# Patient Record
Sex: FEMALE | Race: BLACK OR AFRICAN AMERICAN | Employment: OTHER | URBAN - METROPOLITAN AREA
[De-identification: names, ages, dates, MRNs, and addresses within clinical notes are randomized per-mention and may not be internally consistent; named-entity substitution may affect disease eponyms.]

---

## 2023-02-01 ENCOUNTER — APPOINTMENT (OUTPATIENT)
Dept: ULTRASOUND IMAGING | Age: 73
DRG: 418 | End: 2023-02-01
Payer: MEDICARE

## 2023-02-01 ENCOUNTER — HOSPITAL ENCOUNTER (INPATIENT)
Age: 73
LOS: 3 days | Discharge: HOME | DRG: 418 | End: 2023-02-04
Attending: EMERGENCY MEDICINE | Admitting: INTERNAL MEDICINE
Payer: MEDICARE

## 2023-02-01 ENCOUNTER — APPOINTMENT (OUTPATIENT)
Dept: GENERAL RADIOLOGY | Age: 73
DRG: 418 | End: 2023-02-01
Payer: MEDICARE

## 2023-02-01 ENCOUNTER — APPOINTMENT (OUTPATIENT)
Dept: CT IMAGING | Age: 73
DRG: 418 | End: 2023-02-01
Payer: MEDICARE

## 2023-02-01 DIAGNOSIS — K81.0 ACUTE CHOLECYSTITIS: Primary | ICD-10-CM

## 2023-02-01 LAB
A/G RATIO: 1.2 (ref 1.1–2.2)
ALBUMIN SERPL-MCNC: 3.7 G/DL (ref 3.4–5)
ALP BLD-CCNC: 111 U/L (ref 40–129)
ALT SERPL-CCNC: 14 U/L (ref 10–40)
ANION GAP SERPL CALCULATED.3IONS-SCNC: 9 MMOL/L (ref 3–16)
AST SERPL-CCNC: 17 U/L (ref 15–37)
BACTERIA: ABNORMAL /HPF
BASOPHILS ABSOLUTE: 0.1 K/UL (ref 0–0.2)
BASOPHILS RELATIVE PERCENT: 1.1 %
BILIRUB SERPL-MCNC: <0.2 MG/DL (ref 0–1)
BILIRUBIN URINE: NEGATIVE
BLOOD, URINE: NEGATIVE
BUN BLDV-MCNC: 26 MG/DL (ref 7–20)
CALCIUM SERPL-MCNC: 9.2 MG/DL (ref 8.3–10.6)
CHLORIDE BLD-SCNC: 107 MMOL/L (ref 99–110)
CLARITY: ABNORMAL
CO2: 26 MMOL/L (ref 21–32)
COLOR: YELLOW
CREAT SERPL-MCNC: 1.1 MG/DL (ref 0.6–1.2)
EKG ATRIAL RATE: 58 BPM
EKG DIAGNOSIS: NORMAL
EKG P AXIS: 63 DEGREES
EKG P-R INTERVAL: 176 MS
EKG Q-T INTERVAL: 452 MS
EKG QRS DURATION: 84 MS
EKG QTC CALCULATION (BAZETT): 443 MS
EKG R AXIS: 39 DEGREES
EKG T AXIS: 45 DEGREES
EKG VENTRICULAR RATE: 58 BPM
EOSINOPHILS ABSOLUTE: 0.1 K/UL (ref 0–0.6)
EOSINOPHILS RELATIVE PERCENT: 1 %
EPITHELIAL CELLS, UA: 7 /HPF (ref 0–5)
GFR SERPL CREATININE-BSD FRML MDRD: 54 ML/MIN/{1.73_M2}
GLUCOSE BLD-MCNC: 113 MG/DL (ref 70–99)
GLUCOSE URINE: NEGATIVE MG/DL
HCT VFR BLD CALC: 33.9 % (ref 36–48)
HEMOGLOBIN: 10.8 G/DL (ref 12–16)
HYALINE CASTS: 12 /LPF (ref 0–8)
HYALINE CASTS: PRESENT
KETONES, URINE: ABNORMAL MG/DL
LEUKOCYTE ESTERASE, URINE: NEGATIVE
LIPASE: 228 U/L (ref 13–60)
LYMPHOCYTES ABSOLUTE: 2.3 K/UL (ref 1–5.1)
LYMPHOCYTES RELATIVE PERCENT: 17.3 %
MCH RBC QN AUTO: 27.8 PG (ref 26–34)
MCHC RBC AUTO-ENTMCNC: 31.8 G/DL (ref 31–36)
MCV RBC AUTO: 87.3 FL (ref 80–100)
MICROSCOPIC EXAMINATION: YES
MONOCYTES ABSOLUTE: 0.8 K/UL (ref 0–1.3)
MONOCYTES RELATIVE PERCENT: 6.3 %
NEUTROPHILS ABSOLUTE: 9.8 K/UL (ref 1.7–7.7)
NEUTROPHILS RELATIVE PERCENT: 74.3 %
NITRITE, URINE: NEGATIVE
PDW BLD-RTO: 16.8 % (ref 12.4–15.4)
PH UA: 5 (ref 5–8)
PLATELET # BLD: 207 K/UL (ref 135–450)
PMV BLD AUTO: 10.6 FL (ref 5–10.5)
POTASSIUM REFLEX MAGNESIUM: 4.1 MMOL/L (ref 3.5–5.1)
PROTEIN UA: 30 MG/DL
RBC # BLD: 3.88 M/UL (ref 4–5.2)
RBC UA: 1 /HPF (ref 0–4)
SODIUM BLD-SCNC: 142 MMOL/L (ref 136–145)
SPECIFIC GRAVITY UA: 1.01 (ref 1–1.03)
TOTAL CK: 81 U/L (ref 26–192)
TOTAL PROTEIN: 6.8 G/DL (ref 6.4–8.2)
TROPONIN: <0.01 NG/ML
URINE REFLEX TO CULTURE: ABNORMAL
URINE TYPE: ABNORMAL
UROBILINOGEN, URINE: 1 E.U./DL
WBC # BLD: 13.2 K/UL (ref 4–11)
WBC UA: 5 /HPF (ref 0–5)

## 2023-02-01 PROCEDURE — 76705 ECHO EXAM OF ABDOMEN: CPT

## 2023-02-01 PROCEDURE — 84484 ASSAY OF TROPONIN QUANT: CPT

## 2023-02-01 PROCEDURE — 6360000002 HC RX W HCPCS: Performed by: PHYSICIAN ASSISTANT

## 2023-02-01 PROCEDURE — 6360000004 HC RX CONTRAST MEDICATION: Performed by: PHYSICIAN ASSISTANT

## 2023-02-01 PROCEDURE — 2580000003 HC RX 258: Performed by: INTERNAL MEDICINE

## 2023-02-01 PROCEDURE — 82550 ASSAY OF CK (CPK): CPT

## 2023-02-01 PROCEDURE — 96365 THER/PROPH/DIAG IV INF INIT: CPT

## 2023-02-01 PROCEDURE — 94760 N-INVAS EAR/PLS OXIMETRY 1: CPT

## 2023-02-01 PROCEDURE — 6360000002 HC RX W HCPCS: Performed by: INTERNAL MEDICINE

## 2023-02-01 PROCEDURE — 81001 URINALYSIS AUTO W/SCOPE: CPT

## 2023-02-01 PROCEDURE — 93010 ELECTROCARDIOGRAM REPORT: CPT | Performed by: INTERNAL MEDICINE

## 2023-02-01 PROCEDURE — 71260 CT THORAX DX C+: CPT

## 2023-02-01 PROCEDURE — 93005 ELECTROCARDIOGRAM TRACING: CPT | Performed by: PHYSICIAN ASSISTANT

## 2023-02-01 PROCEDURE — 1200000000 HC SEMI PRIVATE

## 2023-02-01 PROCEDURE — APPNB30 APP NON BILLABLE TIME 0-30 MINS: Performed by: NURSE PRACTITIONER

## 2023-02-01 PROCEDURE — 71045 X-RAY EXAM CHEST 1 VIEW: CPT

## 2023-02-01 PROCEDURE — 6370000000 HC RX 637 (ALT 250 FOR IP): Performed by: INTERNAL MEDICINE

## 2023-02-01 PROCEDURE — 2580000003 HC RX 258: Performed by: PHYSICIAN ASSISTANT

## 2023-02-01 PROCEDURE — APPSS60 APP SPLIT SHARED TIME 46-60 MINUTES: Performed by: NURSE PRACTITIONER

## 2023-02-01 PROCEDURE — 80053 COMPREHEN METABOLIC PANEL: CPT

## 2023-02-01 PROCEDURE — 99285 EMERGENCY DEPT VISIT HI MDM: CPT

## 2023-02-01 PROCEDURE — 83690 ASSAY OF LIPASE: CPT

## 2023-02-01 PROCEDURE — 85025 COMPLETE CBC W/AUTO DIFF WBC: CPT

## 2023-02-01 RX ORDER — ONDANSETRON 4 MG/1
4 TABLET, ORALLY DISINTEGRATING ORAL EVERY 8 HOURS PRN
Status: DISCONTINUED | OUTPATIENT
Start: 2023-02-01 | End: 2023-02-04 | Stop reason: HOSPADM

## 2023-02-01 RX ORDER — SODIUM CHLORIDE 9 MG/ML
INJECTION, SOLUTION INTRAVENOUS PRN
Status: DISCONTINUED | OUTPATIENT
Start: 2023-02-01 | End: 2023-02-04 | Stop reason: HOSPADM

## 2023-02-01 RX ORDER — METOPROLOL SUCCINATE 100 MG/1
100 TABLET, EXTENDED RELEASE ORAL DAILY
COMMUNITY
Start: 2023-01-31

## 2023-02-01 RX ORDER — OMEGA-3S/DHA/EPA/FISH OIL 300-1000MG
300 CAPSULE ORAL DAILY
COMMUNITY

## 2023-02-01 RX ORDER — PANTOPRAZOLE SODIUM 40 MG/1
TABLET, DELAYED RELEASE ORAL
COMMUNITY
Start: 2023-01-18

## 2023-02-01 RX ORDER — METOPROLOL SUCCINATE 50 MG/1
100 TABLET, EXTENDED RELEASE ORAL DAILY
Status: DISCONTINUED | OUTPATIENT
Start: 2023-02-01 | End: 2023-02-04 | Stop reason: HOSPADM

## 2023-02-01 RX ORDER — ASPIRIN 81 MG/1
81 TABLET ORAL DAILY
Status: DISCONTINUED | OUTPATIENT
Start: 2023-02-01 | End: 2023-02-04 | Stop reason: HOSPADM

## 2023-02-01 RX ORDER — LOSARTAN POTASSIUM 100 MG/1
100 TABLET ORAL DAILY
COMMUNITY

## 2023-02-01 RX ORDER — HYDRALAZINE HYDROCHLORIDE 25 MG/1
25 TABLET, FILM COATED ORAL EVERY 8 HOURS SCHEDULED
Status: DISCONTINUED | OUTPATIENT
Start: 2023-02-01 | End: 2023-02-04 | Stop reason: HOSPADM

## 2023-02-01 RX ORDER — LOSARTAN POTASSIUM 100 MG/1
100 TABLET ORAL DAILY
Status: DISCONTINUED | OUTPATIENT
Start: 2023-02-01 | End: 2023-02-04 | Stop reason: HOSPADM

## 2023-02-01 RX ORDER — 0.9 % SODIUM CHLORIDE 0.9 %
500 INTRAVENOUS SOLUTION INTRAVENOUS ONCE
Status: COMPLETED | OUTPATIENT
Start: 2023-02-01 | End: 2023-02-01

## 2023-02-01 RX ORDER — LORATADINE 10 MG/1
10 TABLET ORAL DAILY
COMMUNITY

## 2023-02-01 RX ORDER — POLYETHYLENE GLYCOL 3350 17 G/17G
17 POWDER, FOR SOLUTION ORAL DAILY PRN
Status: DISCONTINUED | OUTPATIENT
Start: 2023-02-01 | End: 2023-02-04 | Stop reason: HOSPADM

## 2023-02-01 RX ORDER — SITAGLIPTIN 50 MG/1
TABLET, FILM COATED ORAL
COMMUNITY
Start: 2023-01-11

## 2023-02-01 RX ORDER — HYDROCHLOROTHIAZIDE 25 MG/1
TABLET ORAL
COMMUNITY
Start: 2023-01-09

## 2023-02-01 RX ORDER — ACETAMINOPHEN 325 MG/1
650 TABLET ORAL EVERY 6 HOURS PRN
Status: DISCONTINUED | OUTPATIENT
Start: 2023-02-01 | End: 2023-02-04 | Stop reason: HOSPADM

## 2023-02-01 RX ORDER — ATORVASTATIN CALCIUM 80 MG/1
80 TABLET, FILM COATED ORAL NIGHTLY
Status: DISCONTINUED | OUTPATIENT
Start: 2023-02-01 | End: 2023-02-04 | Stop reason: HOSPADM

## 2023-02-01 RX ORDER — SODIUM CHLORIDE 0.9 % (FLUSH) 0.9 %
5-40 SYRINGE (ML) INJECTION PRN
Status: DISCONTINUED | OUTPATIENT
Start: 2023-02-01 | End: 2023-02-04 | Stop reason: HOSPADM

## 2023-02-01 RX ORDER — ONDANSETRON 2 MG/ML
4 INJECTION INTRAMUSCULAR; INTRAVENOUS EVERY 6 HOURS PRN
Status: DISCONTINUED | OUTPATIENT
Start: 2023-02-01 | End: 2023-02-02

## 2023-02-01 RX ORDER — MORPHINE SULFATE 2 MG/ML
2 INJECTION, SOLUTION INTRAMUSCULAR; INTRAVENOUS EVERY 4 HOURS PRN
Status: DISCONTINUED | OUTPATIENT
Start: 2023-02-01 | End: 2023-02-04 | Stop reason: HOSPADM

## 2023-02-01 RX ORDER — ACETAMINOPHEN 650 MG/1
650 SUPPOSITORY RECTAL EVERY 6 HOURS PRN
Status: DISCONTINUED | OUTPATIENT
Start: 2023-02-01 | End: 2023-02-04 | Stop reason: HOSPADM

## 2023-02-01 RX ORDER — ENOXAPARIN SODIUM 100 MG/ML
40 INJECTION SUBCUTANEOUS DAILY
Status: DISCONTINUED | OUTPATIENT
Start: 2023-02-01 | End: 2023-02-02

## 2023-02-01 RX ORDER — ATORVASTATIN CALCIUM 40 MG/1
80 TABLET, FILM COATED ORAL NIGHTLY
COMMUNITY
Start: 2022-11-04

## 2023-02-01 RX ORDER — HYDRALAZINE HYDROCHLORIDE 25 MG/1
TABLET, FILM COATED ORAL
COMMUNITY
Start: 2023-01-18

## 2023-02-01 RX ORDER — SODIUM CHLORIDE 0.9 % (FLUSH) 0.9 %
5-40 SYRINGE (ML) INJECTION EVERY 12 HOURS SCHEDULED
Status: DISCONTINUED | OUTPATIENT
Start: 2023-02-01 | End: 2023-02-04 | Stop reason: HOSPADM

## 2023-02-01 RX ADMIN — PIPERACILLIN AND TAZOBACTAM 3375 MG: 3; .375 INJECTION, POWDER, FOR SOLUTION INTRAVENOUS at 17:07

## 2023-02-01 RX ADMIN — LOSARTAN POTASSIUM 100 MG: 100 TABLET, FILM COATED ORAL at 17:00

## 2023-02-01 RX ADMIN — HYDRALAZINE HYDROCHLORIDE 25 MG: 25 TABLET, FILM COATED ORAL at 16:59

## 2023-02-01 RX ADMIN — Medication 10 ML: at 21:25

## 2023-02-01 RX ADMIN — PIPERACILLIN AND TAZOBACTAM 3375 MG: 3; .375 INJECTION, POWDER, FOR SOLUTION INTRAVENOUS at 12:07

## 2023-02-01 RX ADMIN — ATORVASTATIN CALCIUM 80 MG: 80 TABLET, FILM COATED ORAL at 21:25

## 2023-02-01 RX ADMIN — IOPAMIDOL 75 ML: 755 INJECTION, SOLUTION INTRAVENOUS at 09:55

## 2023-02-01 RX ADMIN — SODIUM CHLORIDE: 9 INJECTION, SOLUTION INTRAVENOUS at 17:04

## 2023-02-01 RX ADMIN — HYDRALAZINE HYDROCHLORIDE 25 MG: 25 TABLET, FILM COATED ORAL at 21:25

## 2023-02-01 RX ADMIN — METOPROLOL SUCCINATE 100 MG: 50 TABLET, EXTENDED RELEASE ORAL at 17:00

## 2023-02-01 RX ADMIN — SODIUM CHLORIDE 500 ML: 9 INJECTION, SOLUTION INTRAVENOUS at 10:11

## 2023-02-01 ASSESSMENT — LIFESTYLE VARIABLES
HOW MANY STANDARD DRINKS CONTAINING ALCOHOL DO YOU HAVE ON A TYPICAL DAY: 1 OR 2
HOW OFTEN DO YOU HAVE A DRINK CONTAINING ALCOHOL: 2-4 TIMES A MONTH
HOW OFTEN DO YOU HAVE A DRINK CONTAINING ALCOHOL: MONTHLY OR LESS

## 2023-02-01 ASSESSMENT — PAIN DESCRIPTION - LOCATION: LOCATION: ABDOMEN;BACK

## 2023-02-01 ASSESSMENT — PAIN DESCRIPTION - DESCRIPTORS: DESCRIPTORS: ACHING

## 2023-02-01 ASSESSMENT — PAIN SCALES - GENERAL
PAINLEVEL_OUTOF10: 5
PAINLEVEL_OUTOF10: 0
PAINLEVEL_OUTOF10: 0

## 2023-02-01 ASSESSMENT — PAIN - FUNCTIONAL ASSESSMENT: PAIN_FUNCTIONAL_ASSESSMENT: 0-10

## 2023-02-01 NOTE — H&P
HOSPITALISTS HISTORY AND PHYSICAL    2/1/2023 1:41 PM    Patient Information:  Isaias Farfan is a 71 y.o. female [id number]  PCP:  Lesa Soto MD (Tel: [phone] )    Chief complaint:    Chief Complaint   Patient presents with    Abdominal Pain     Arrived via  EMS d/t abd pain, Fluttering cardiac (being checked out in March); Sinus joselyn; HTN; ; -59 manual        History of Present Illness:  Sofía Archibald is a 71 y.o. female   With feeling uncomfortable dull discomfort of radiating to her back and nausea but no vomiting no diarrhea no fever no chills no shortness of breath. In the ED patient had imaging that was suggestive of cholecystitis. Patient smokes half pack per day does not drink    REVIEW OF SYSTEMS:   Constitutional: Negative for fever,chills or night sweats  ENT: Negative for rhinorrhea, epistaxis, hoarseness, sore throat. Respiratory: Negative for shortness of breath,wheezing  Cardiovascular: Negative for chest pain, palpitations   Gastrointestinal: see above  Genitourinary: Negative for polyuria, dysuria   Hematologic/Lymphatic: Negative for bleeding tendency, easy bruising  Musculoskeletal: Negative for myalgias and arthralgias  Neurologic: Negative for confusion,dysarthria. Skin: Negative for itching,rash  Psychiatric: Negative for depression,anxiety, agitation. Endocrine: Negative for polydipsia,polyuria,heat /cold intolerance. Past Medical History:   has a past medical history of Allergic rhinitis, Asthma, Colorectal polyps, Hyperlipidemia, Menopause, Obesity, and Thyroid nodule. Past Surgical History:   has a past surgical history that includes Hysterectomy and Tubal ligation (28). Medications:  No current facility-administered medications on file prior to encounter.      Current Outpatient Medications on File Prior to Encounter   Medication Sig Dispense Refill    losartan (COZAAR) 100 MG tablet Take 100 mg by mouth daily      pantoprazole (PROTONIX) 40 MG tablet TAKE 1 TABLET BY MOUTH EVERY DAY      atorvastatin (LIPITOR) 40 MG tablet TAKE 1 TABLET BY MOUTH EVERY DAY      hydrALAZINE (APRESOLINE) 25 MG tablet TAKE 1 TABLET (25 MG TOTAL) BY MOUTH 2 TIMES A DAY. FOR HTN      loratadine (CLARITIN) 10 MG tablet Take 10 mg by mouth daily      metoprolol succinate (TOPROL XL) 100 MG extended release tablet       JANUVIA 50 MG tablet TAKE 1 TABLET BY MOUTH EVERY DAY      hydroCHLOROthiazide (HYDRODIURIL) 25 MG tablet TAKE 1 TABLET BY MOUTH EVERY DAY      lisinopril (PRINIVIL;ZESTRIL) 40 MG tablet Take 1 tablet by mouth daily. 90 tablet 3    aspirin 81 MG EC tablet Take 81 mg by mouth daily. Allergies: Allergies   Allergen Reactions    Penicillins         Social History:  Patient Lives at home   reports that she has been smoking cigarettes. She has been smoking an average of 1 pack per day. She does not have any smokeless tobacco history on file. She reports current alcohol use. She reports that she does not use drugs. Family History:  family history includes Heart Disease in her mother. ,     Physical Exam:  BP (!) 173/34   Pulse 57   Temp 98.7 °F (37.1 °C) (Oral)   Resp 15   Ht 5' 4\" (1.626 m)   Wt 195 lb (88.5 kg)   SpO2 98%   BMI 33.47 kg/m²     General appearance:  Appears comfortable. AAOx3  HEENT: atraumatic, Pupils equal, muscous membranes moist, no masses appreciated  Cardiovascular: Regular rate and rhythm no murmurs appreciated  Respiratory: CTAB no wheezing  Gastrointestinal: RUQ tenderness to palpation no guarding  EXT: no edema  Neurology: no gross focal deficts  Psychiatry: Appropriate affect.  Not agitated  Skin: Warm, dry, no rashes appreciated    Labs:  CBC:   Lab Results   Component Value Date/Time    WBC 13.2 02/01/2023 08:45 AM    RBC 3.88 02/01/2023 08:45 AM    HGB 10.8 02/01/2023 08:45 AM    HCT 33.9 02/01/2023 08:45 AM MCV 87.3 02/01/2023 08:45 AM    MCH 27.8 02/01/2023 08:45 AM    MCHC 31.8 02/01/2023 08:45 AM    RDW 16.8 02/01/2023 08:45 AM     02/01/2023 08:45 AM    MPV 10.6 02/01/2023 08:45 AM     BMP:    Lab Results   Component Value Date/Time     02/01/2023 08:45 AM    K 4.1 02/01/2023 08:45 AM     02/01/2023 08:45 AM    CO2 26 02/01/2023 08:45 AM    BUN 26 02/01/2023 08:45 AM    CREATININE 1.1 02/01/2023 08:45 AM    CALCIUM 9.2 02/01/2023 08:45 AM    LABGLOM 54 02/01/2023 08:45 AM    GLUCOSE 113 02/01/2023 08:45 AM     CT CHEST ABDOMEN PELVIS W CONTRAST Additional Contrast? None   Final Result   1. Cardiomegaly with mild interstitial edema. 2. Solid subcentimeter bilateral pulmonary nodules. 3. Mediastinal and left axillary adenopathy could be reactive. 4. Mild pericholecystic inflammatory stranding can be seen in the setting of   cholecystitis. RECOMMENDATION:   Fleischner Society guidelines for follow-up and management of incidentally   detected pulmonary nodules:      Multiple Solid Nodules:      Nodule size less than 6 mm   In a low-risk patient, no routine follow-up. In a high-risk patient, optional CT at 12 months. XR CHEST PORTABLE   Final Result   1. Cardiomegaly. US GALLBLADDER RUQ    (Results Pending)       Recent imaging reviewed    Problem List  Principal Problem:    Acute cholecystitis  Resolved Problems:    * No resolved hospital problems. *        Assessment/Plan:   Acute cholecystitis  - zosyn  - RUQ us  - iv morphine  - surgery consulted    Htn urgency: home meds and titrate up as needed      Elevated lipase trend    Tobacco abuse: counseled on need to to quit, does not want nicotine patch        DVT prophylaxis lovenox  Code status full code        Admit as inpatient I anticipate hospitalization spanning more than two midnights for investigation and treatment of the above medically necessary diagnoses.     Please note that some part of this chart was generated using Dragon dictation software. Although every effort was made to ensure the accuracy of this automated transcription, some errors in transcription may have occurred inadvertently. If you may need any clarification, please do not hesitate to contact me through [facility].        Daylin Woodruff MD    2/1/2023 1:41 PM

## 2023-02-01 NOTE — ED PROVIDER NOTES
I did not see this patient personally. I only interpreted their EKG.   Reveals:  joselyn sinus rhythm  No ST changes  Heart rate 58    no prior EKG available for comparison        Eddie Leon MD  02/01/23 6092

## 2023-02-01 NOTE — PROGRESS NOTES
Patient seen in ED, room 20. Admission completed except for: 4 Eyes Assessment, Immunizations, Covid Vaccines, Rights and Responsibilities, Orientation to room, Plan of Care, education, white board, height and weight, pain assessment and head to toe assessment. Patient is alert and oriented X 4. Patient lives at home with her spouse and is being admitted for acute cholecystitis. Home Medication Reconciliation has been verbally reviewed with patient and updated as appropriate. Plan of care updated if indicated. All questions answered.

## 2023-02-01 NOTE — PLAN OF CARE
[place] [address] and Laparoscopic Surgery        Assessment & Plan of Care    History of Present Illness: Ms. Ted Harrell is a 71 y.o. female who presented to the ED with constant, achy, upper abdominal pain that radiates into the chest and through to the back and began this morning around 0500. She reports that it's more of a discomfort than pain and rates the intensity a 5 out of 10. She tried Tylenol without relief, pain is better when lying still; no aggravating factors. Reports being unable to sleep after onset, just \"couldn't get comfortable\". Associated nausea and chills. She denies fevers, vomiting, or weight loss. She did have one similar episode about 6 months ago, but reports did not last as long. Only occasional alcohol intake; reports maybe one mixed drink within the last week. New medications includes Hydralazine, which she reports starting in 12/2022. Colonoscopy last in 2019. Prior abdominal surgery includes tubal ligation and hysterectomy for uterine cancer. No blood thinners. Current smoker. Physical Exam:  CONSTITUTIONAL:  alert, no apparent distress and mildly obese  NEUROLOGIC:  Mental Status Exam:  Level of Alertness:   awake  Orientation:   person, place, time  EYES:  sclera clear  ENT:  normocepalic, without obvious abnormality  NECK:  supple, symmetrical, trachea midline  LUNGS:  clear to auscultation  CARDIOVASCULAR:  regular rate and rhythm and no murmur noted  ABDOMEN: soft, non-distended, upper abdominal tenderness noted, voluntary guarding absent, no masses palpated, normal bowel sounds, and hernia absent  Extremities: no edema  SKIN:  no bruising or bleeding and normal skin color, texture, turgor    Assessment:  Acute calculous cholecystitis  Elevated serum lipase, 228    Plan:  1.  Ultrasound obtained and reveals cholelithiasis with mild gallbladder wall thickening and positive sonographic Padilla sign; continued supportive care, repeat labs tomorrow morning, tentatively planning for laparoscopic cholecystectomy tomorrow at 0900 with Dr. Viry Gutierrez  2. NPO; monitor bowel function  3. IV hydration; monitor and correct electrolytes  4. Antibiotics  5. Activity as tolerated  6. Pulmonary toilet, incentive spirometry  7. PRN analgesics and antiemetics--minimizing narcotics as tolerated  8. DVT prophylaxis with SCD's  9. Management of medical comorbid etiologies per primary team and consulting services    EDUCATION:  Educated patient on plan of care and disease process--all questions answered. Plans discussed with patient and nursing. Reviewed and discussed with Dr. Ebony Marcial consult to follow.       Signed:  SERENA Hayes - CNP  2/1/2023 4:05 PM

## 2023-02-01 NOTE — PROGRESS NOTES
4 Eyes Skin Assessment     NAME:  Wesley Temple  YOB: 1950  MEDICAL RECORD NUMBER:  [id number]    The patient is being assessed for  Admission    I agree that One RN have performed a thorough Head to Toe Skin Assessment on the patient. ALL assessment sites listed below have been assessed. Areas assessed by both nurses:    Head, Face, Ears, Shoulders, Back, Chest, Arms, Elbows, Hands, Sacrum. Buttock, Coccyx, Ischium, and Legs. Feet and Heels        Does the Patient have a Wound?  No noted wound(s)       Rafita Prevention initiated by RN: Yes   Wound Care Orders initiated by RN: Yes    Pressure Injury (Stage 3,4, Unstageable, DTI, NWPT, and Complex wounds) if present place referral order by RN under : NA    New and Established Ostomies, if present place, referral order under : NA      Nurse 1 eSignature: Electronically signed by Hamida Jules RN on 2/1/23 at 4:41 PM EST    **SHARE this note so that the co-signing nurse is able to place an eSignature**    Nurse 2 eSignature: Electronically signed by Luci Nunez LPN on 4/7/14 at 1:82 PM EST

## 2023-02-01 NOTE — ED PROVIDER NOTES
[facility]        Pt Name: Tiburcio Gill  MRN: [id number]  [place] 1950  Date of evaluation: 2/1/2023  Provider: Branden Faith PA-C  PCP: Anuja Rush MD  Note Started: 9:35 AM EST 2/1/23      SOILA. I have evaluated this patient. My supervising physician was available for consultation. CHIEF COMPLAINT       Chief Complaint   Patient presents with    Abdominal Pain     Arrived via  EMS d/t abd pain, Fluttering cardiac (being checked out in March); Sinus joselyn; HTN; ; -35 manual       HISTORY OF PRESENT ILLNESS: 1 or more Elements     History From: patient  Limitations to history : None    Tiburcio Gill is a 71 y.o. female who presents to the emergency department with a chief complaint of \"feeling uncomfortable. \"  She states she woke up at 5 AM this morning to go the bathroom and she felt okay. She states when she went back to bed she just felt \"uncomfortable, like I cannot get my pillow in the right position or like a dog trying to get themselves comfortable and lying down. \"  She states she has some discomfort in her back, chest and abdomen associated with this. She states she has had some intermittent nausea associated with this and weakness also. Denies vomiting, dysuria, hematuria, diarrhea, bloody stool, shortness of breath, cough, fevers or any other symptoms. Nursing Notes were all reviewed and agreed with or any disagreements were addressed in the HPI. REVIEW OF SYSTEMS :      Review of Systems    Positives and Pertinent negatives as per HPI. 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     ATORVASTATIN (LIPITOR) 40 MG TABLET    Take 80 mg by mouth at bedtime    HYDRALAZINE (APRESOLINE) 25 MG TABLET    TAKE 1 TABLET (25 MG TOTAL) BY MOUTH 2 TIMES A DAY. FOR HTN    HYDROCHLOROTHIAZIDE (HYDRODIURIL) 25 MG TABLET    TAKE 1 TABLET BY MOUTH EVERY DAY    JANUVIA 50 MG TABLET    TAKE 1 TABLET BY MOUTH EVERY DAY    LISINOPRIL (PRINIVIL;ZESTRIL) 40 MG TABLET    Take 1 tablet by mouth daily.    LORATADINE (CLARITIN) 10 MG TABLET    Take 10 mg by mouth daily    LOSARTAN (COZAAR) 100 MG TABLET    Take 100 mg by mouth daily    METOPROLOL SUCCINATE (TOPROL XL) 100 MG EXTENDED RELEASE TABLET    Take 100 mg by mouth daily    OMEGA-3 FATTY ACIDS (OMEGA-3 FISH OIL) 300 MG CAPS    Take 300 mg by mouth daily    PANTOPRAZOLE (PROTONIX) 40 MG TABLET    TAKE 1 TABLET BY MOUTH EVERY DAY       ALLERGIES     Cephalexin and Penicillins    FAMILYHISTORY       Family History   Problem Relation Age of Onset    Heart Disease Mother         CHF    Alcohol Abuse Brother     Heart Attack Brother     Stroke Brother         SOCIAL HISTORY       Social History     Tobacco Use    Smoking status: Every Day     Packs/day: 1.00     Types: Cigarettes    Tobacco comments:     Started: 1965: does not smoke more than 5 cigarettes in a day   Vaping Use    Vaping Use: Never used   Substance Use Topics    Alcohol use: Yes     Comment: 4 drinks a month    Drug use: No       SCREENINGS        [place] Coma Scale  Eye Opening: Spontaneous  Best Verbal Response: Oriented  Best Motor Response: Obeys commands  [place] Coma Scale Score: 15                CIWA Assessment  BP: (!) 190/42  Heart Rate: 50           PHYSICAL EXAM  1 or more Elements     ED Triage Vitals [02/01/23 0831]   BP Temp Temp Source Heart Rate Resp SpO2 Height Weight   (!) 211/71 98.7 °F (37.1 °C) Oral 57 16 98 % 5' 4\" (1.626 m) 195 lb (88.5 kg)       Physical Exam  Vitals and nursing note reviewed.   Constitutional:       Appearance: She is well-developed. She is not diaphoretic.   HENT:      Head: Atraumatic.       Nose: Nose normal.   Eyes:      General:         Right eye: No discharge. Left eye: No discharge. Cardiovascular:      Rate and Rhythm: Normal rate and regular rhythm. Heart sounds: No murmur heard. No friction rub. No gallop. Pulmonary:      Effort: Pulmonary effort is normal. No respiratory distress. Breath sounds: No stridor. No wheezing, rhonchi or rales. Abdominal:      General: Bowel sounds are normal. There is no distension. Palpations: Abdomen is soft. There is no mass. Tenderness: There is no abdominal tenderness. There is no guarding or rebound. Hernia: No hernia is present. Musculoskeletal:         General: No swelling. Normal range of motion. Cervical back: Normal range of motion. Skin:     General: Skin is warm and dry. Findings: No erythema or rash. Neurological:      Mental Status: She is alert and oriented to person, place, and time. Cranial Nerves: No cranial nerve deficit.    Psychiatric:         Behavior: Behavior normal.           DIAGNOSTIC RESULTS   LABS:    Labs Reviewed   CBC WITH AUTO DIFFERENTIAL - Abnormal; Notable for the following components:       Result Value    WBC 13.2 (*)     RBC 3.88 (*)     Hemoglobin 10.8 (*)     Hematocrit 33.9 (*)     RDW 16.8 (*)     MPV 10.6 (*)     Neutrophils Absolute 9.8 (*)     All other components within normal limits   COMPREHENSIVE METABOLIC PANEL W/ REFLEX TO MG FOR LOW K - Abnormal; Notable for the following components:    Glucose 113 (*)     BUN 26 (*)     Est, Glom Filt Rate 54 (*)     All other components within normal limits   URINALYSIS WITH REFLEX TO CULTURE - Abnormal; Notable for the following components:    Clarity, UA CLOUDY (*)     Ketones, Urine TRACE (*)     Protein, UA 30 (*)     All other components within normal limits   LIPASE - Abnormal; Notable for the following components:    Lipase 228.0 (*)     All other components within normal limits   MICROSCOPIC URINALYSIS - Abnormal; Notable for the following components:    Bacteria, UA 1+ (*)     Hyaline Casts, UA 12 (*)     Epithelial Cells, UA 7 (*)     Hyaline Casts, UA Present (*)     All other components within normal limits   TROPONIN   CK       When ordered only abnormal lab results are displayed. All other labs were within normal range or not returned as of this dictation. EKG: When ordered, EKG's are interpreted by the Emergency Department Physician in the absence of a cardiologist.  Please see their note for interpretation of EKG. RADIOLOGY:   Non-plain film images such as CT, Ultrasound and MRI are read by the radiologist. Plain radiographic images are visualized and preliminarily interpreted by the ED Provider with the below findings:        Interpretation per the Radiologist below, if available at the time of this note:    [address]   Final Result   Cholelithiasis with mild gallbladder wall thickening and positive sonographic   Padilla sign. Findings are suggestive of acute cholecystitis. Recommend HIDA   scan for confirmation. CT CHEST ABDOMEN PELVIS W CONTRAST Additional Contrast? None   Final Result   1. Cardiomegaly with mild interstitial edema. 2. Solid subcentimeter bilateral pulmonary nodules. 3. Mediastinal and left axillary adenopathy could be reactive. 4. Mild pericholecystic inflammatory stranding can be seen in the setting of   cholecystitis. RECOMMENDATION:   Fleischner Society guidelines for follow-up and management of incidentally   detected pulmonary nodules:      Multiple Solid Nodules:      Nodule size less than 6 mm   In a low-risk patient, no routine follow-up. In a high-risk patient, optional CT at 12 months. XR CHEST PORTABLE   Final Result   1. Cardiomegaly.            XR CHEST PORTABLE    Result Date: 2/1/2023  EXAMINATION: ONE XRAY VIEW OF THE CHEST 2/1/2023 8:48 am COMPARISON: 01/10/2008 HISTORY: ORDERING SYSTEM PROVIDED HISTORY: weakness TECHNOLOGIST PROVIDED HISTORY: Reason for exam:->weakness FINDINGS: The lungs are clear. The cardiac silhouette is enlarged. There is no pneumothorax or pleural effusion. 1. Cardiomegaly. No results found. PROCEDURES   Unless otherwise noted below, none     Procedures    CRITICAL CARE TIME (.cctime)       PAST MEDICAL HISTORY      has a past medical history of Allergic rhinitis, Asthma, Colorectal polyps, Diabetes ([address] Hyperlipidemia, Menopause, Obesity, and Thyroid nodule. EMERGENCY DEPARTMENT COURSE and DIFFERENTIAL DIAGNOSIS/MDM:   Vitals:    Vitals:    02/01/23 1415 02/01/23 1430 02/01/23 1435 02/01/23 1445   BP:   (!) 187/40 (!) 190/42   Pulse: 52 55 51 50   Resp: 19 19 16 20   Temp:       TempSrc:       SpO2: 98% 98% 98% 96%   Weight:       Height:           Patient was given the following medications:  Medications   0.9 % sodium chloride bolus (0 mLs IntraVENous Stopped 2/1/23 1249)   iopamidol (ISOVUE-370) 76 % injection 75 mL (75 mLs IntraVENous Given 2/1/23 0955)   piperacillin-tazobactam (ZOSYN) 3,375 mg in sodium chloride 0.9 % 50 mL IVPB (mini-bag) (0 mg IntraVENous Stopped 2/1/23 1249)             Is this patient to be included in the SEP-1 Core Measure due to severe sepsis or septic shock? No   Exclusion criteria - the patient is NOT to be included for SEP-1 Core Measure due to:  2+ SIRS criteria are not met    Chronic Conditions affecting care:    has a past medical history of Allergic rhinitis, Asthma, Colorectal polyps, Diabetes ([address] Hyperlipidemia, Menopause, Obesity, and Thyroid nodule. CONSULTS: (Who and What was discussed)  Aristides Campuzano -hospitalist    Social Determinants : None    Records Reviewed (Source):     CC/HPI Summary, DDx, ED Course, and Reassessment: Patient presented with symptoms that she had difficulty explaining.   States she was just feeling uncomfortable with some pain in her chest, upper abdomen and back. States she just felt like she could not get comfortable. Laboratory testing was significant however for leukocytosis of 13,000, BUN of 26 and lipase of 228. Due to this CT was obtained that does reveal some pericholecystic inflammatory stranding seen in the setting of cholecystitis. She was started on Zosyn. She has allergy listed to penicillin however after discussion with the patient she states that it was cephalexin that she took and had allergy to an states she has taken cloxacillin and amoxicillin before without any allergy. Due to this we will start her on Zosyn. Discussed this with Gema-NP from general surgery who evaluated patient here. Would like admission to hospitalist and ultrasound was placed. Repeat of the patient she does have some mild tenderness to palpate in the epigastric region but no guarding, rebound or rigidity and a negative Padilla sign. She was stable time of admission. Do not believe any further work-up or testing is warranted at this time. Disposition Considerations (tests considered but not done, Admit vs D/C, Shared Decision Making, Pt Expectation of Test or Tx.):        I am the Primary Clinician of Record. FINAL IMPRESSION      1. Acute cholecystitis          DISPOSITION/PLAN     DISPOSITION Admitted 02/01/2023 01:17:09 PM      PATIENT REFERRED TO:  No follow-up provider specified.     DISCHARGE MEDICATIONS:  New Prescriptions    No medications on file       DISCONTINUED MEDICATIONS:  Discontinued Medications    No medications on file              (Please note that portions of this note were completed with a voice recognition program.  Efforts were made to edit the dictations but occasionally words are mis-transcribed.)    Solis Eagle PA-C (electronically signed)        Solis Eagle PA-C  02/01/23 1727

## 2023-02-02 ENCOUNTER — APPOINTMENT (OUTPATIENT)
Dept: GENERAL RADIOLOGY | Age: 73
DRG: 418 | End: 2023-02-02
Payer: MEDICARE

## 2023-02-02 ENCOUNTER — ANESTHESIA (OUTPATIENT)
Dept: OPERATING ROOM | Age: 73
End: 2023-02-02
Payer: MEDICARE

## 2023-02-02 ENCOUNTER — ANESTHESIA EVENT (OUTPATIENT)
Dept: OPERATING ROOM | Age: 73
End: 2023-02-02
Payer: MEDICARE

## 2023-02-02 LAB
A/G RATIO: 1.1 (ref 1.1–2.2)
ALBUMIN SERPL-MCNC: 3.2 G/DL (ref 3.4–5)
ALP BLD-CCNC: 95 U/L (ref 40–129)
ALT SERPL-CCNC: 11 U/L (ref 10–40)
ANION GAP SERPL CALCULATED.3IONS-SCNC: 13 MMOL/L (ref 3–16)
AST SERPL-CCNC: 15 U/L (ref 15–37)
BASOPHILS ABSOLUTE: 0.1 K/UL (ref 0–0.2)
BASOPHILS RELATIVE PERCENT: 1 %
BILIRUB SERPL-MCNC: 0.6 MG/DL (ref 0–1)
BUN BLDV-MCNC: 18 MG/DL (ref 7–20)
CALCIUM SERPL-MCNC: 8.5 MG/DL (ref 8.3–10.6)
CHLORIDE BLD-SCNC: 110 MMOL/L (ref 99–110)
CO2: 21 MMOL/L (ref 21–32)
CREAT SERPL-MCNC: 1 MG/DL (ref 0.6–1.2)
EOSINOPHILS ABSOLUTE: 0.2 K/UL (ref 0–0.6)
EOSINOPHILS RELATIVE PERCENT: 1.7 %
GFR SERPL CREATININE-BSD FRML MDRD: >60 ML/MIN/{1.73_M2}
GLUCOSE BLD-MCNC: 106 MG/DL (ref 70–99)
GLUCOSE BLD-MCNC: 116 MG/DL (ref 70–99)
GLUCOSE BLD-MCNC: 133 MG/DL (ref 70–99)
GLUCOSE BLD-MCNC: 138 MG/DL (ref 70–99)
GLUCOSE BLD-MCNC: 83 MG/DL (ref 70–99)
HCT VFR BLD CALC: 31.8 % (ref 36–48)
HEMOGLOBIN: 10.2 G/DL (ref 12–16)
LIPASE: 21 U/L (ref 13–60)
LYMPHOCYTES ABSOLUTE: 2.5 K/UL (ref 1–5.1)
LYMPHOCYTES RELATIVE PERCENT: 26.5 %
MCH RBC QN AUTO: 28 PG (ref 26–34)
MCHC RBC AUTO-ENTMCNC: 32.1 G/DL (ref 31–36)
MCV RBC AUTO: 87.2 FL (ref 80–100)
MONOCYTES ABSOLUTE: 0.6 K/UL (ref 0–1.3)
MONOCYTES RELATIVE PERCENT: 6.9 %
NEUTROPHILS ABSOLUTE: 5.9 K/UL (ref 1.7–7.7)
NEUTROPHILS RELATIVE PERCENT: 63.9 %
PDW BLD-RTO: 16.5 % (ref 12.4–15.4)
PERFORMED ON: ABNORMAL
PLATELET # BLD: 172 K/UL (ref 135–450)
PMV BLD AUTO: 11.2 FL (ref 5–10.5)
POTASSIUM REFLEX MAGNESIUM: 3.8 MMOL/L (ref 3.5–5.1)
RBC # BLD: 3.64 M/UL (ref 4–5.2)
SODIUM BLD-SCNC: 144 MMOL/L (ref 136–145)
TOTAL PROTEIN: 6.2 G/DL (ref 6.4–8.2)
WBC # BLD: 9.3 K/UL (ref 4–11)

## 2023-02-02 PROCEDURE — 97165 OT EVAL LOW COMPLEX 30 MIN: CPT

## 2023-02-02 PROCEDURE — BF121ZZ FLUOROSCOPY OF GALLBLADDER USING LOW OSMOLAR CONTRAST: ICD-10-PCS | Performed by: SURGERY

## 2023-02-02 PROCEDURE — 0FT44ZZ RESECTION OF GALLBLADDER, PERCUTANEOUS ENDOSCOPIC APPROACH: ICD-10-PCS | Performed by: SURGERY

## 2023-02-02 PROCEDURE — 85025 COMPLETE CBC W/AUTO DIFF WBC: CPT

## 2023-02-02 PROCEDURE — 3700000000 HC ANESTHESIA ATTENDED CARE: Performed by: SURGERY

## 2023-02-02 PROCEDURE — 88304 TISSUE EXAM BY PATHOLOGIST: CPT

## 2023-02-02 PROCEDURE — 97530 THERAPEUTIC ACTIVITIES: CPT

## 2023-02-02 PROCEDURE — C1894 INTRO/SHEATH, NON-LASER: HCPCS | Performed by: SURGERY

## 2023-02-02 PROCEDURE — 80053 COMPREHEN METABOLIC PANEL: CPT

## 2023-02-02 PROCEDURE — 6370000000 HC RX 637 (ALT 250 FOR IP): Performed by: SURGERY

## 2023-02-02 PROCEDURE — 2580000003 HC RX 258: Performed by: SURGERY

## 2023-02-02 PROCEDURE — 7100000000 HC PACU RECOVERY - FIRST 15 MIN: Performed by: SURGERY

## 2023-02-02 PROCEDURE — 2500000003 HC RX 250 WO HCPCS: Performed by: SURGERY

## 2023-02-02 PROCEDURE — 97535 SELF CARE MNGMENT TRAINING: CPT

## 2023-02-02 PROCEDURE — 6370000000 HC RX 637 (ALT 250 FOR IP): Performed by: INTERNAL MEDICINE

## 2023-02-02 PROCEDURE — 6360000002 HC RX W HCPCS: Performed by: NURSE ANESTHETIST, CERTIFIED REGISTERED

## 2023-02-02 PROCEDURE — 6360000004 HC RX CONTRAST MEDICATION: Performed by: SURGERY

## 2023-02-02 PROCEDURE — 97161 PT EVAL LOW COMPLEX 20 MIN: CPT

## 2023-02-02 PROCEDURE — 1200000000 HC SEMI PRIVATE

## 2023-02-02 PROCEDURE — 6360000002 HC RX W HCPCS: Performed by: INTERNAL MEDICINE

## 2023-02-02 PROCEDURE — 83690 ASSAY OF LIPASE: CPT

## 2023-02-02 PROCEDURE — 2720000010 HC SURG SUPPLY STERILE: Performed by: SURGERY

## 2023-02-02 PROCEDURE — 3700000001 HC ADD 15 MINUTES (ANESTHESIA): Performed by: SURGERY

## 2023-02-02 PROCEDURE — 3600000014 HC SURGERY LEVEL 4 ADDTL 15MIN: Performed by: SURGERY

## 2023-02-02 PROCEDURE — 2709999900 HC NON-CHARGEABLE SUPPLY: Performed by: SURGERY

## 2023-02-02 PROCEDURE — 7100000001 HC PACU RECOVERY - ADDTL 15 MIN: Performed by: SURGERY

## 2023-02-02 PROCEDURE — 2500000003 HC RX 250 WO HCPCS: Performed by: NURSE ANESTHETIST, CERTIFIED REGISTERED

## 2023-02-02 PROCEDURE — 36415 COLL VENOUS BLD VENIPUNCTURE: CPT

## 2023-02-02 PROCEDURE — 2580000003 HC RX 258: Performed by: INTERNAL MEDICINE

## 2023-02-02 PROCEDURE — A4217 STERILE WATER/SALINE, 500 ML: HCPCS | Performed by: SURGERY

## 2023-02-02 PROCEDURE — 47563 LAPARO CHOLECYSTECTOMY/GRAPH: CPT | Performed by: SURGERY

## 2023-02-02 PROCEDURE — 97116 GAIT TRAINING THERAPY: CPT

## 2023-02-02 PROCEDURE — 2700000000 HC OXYGEN THERAPY PER DAY

## 2023-02-02 PROCEDURE — 6360000002 HC RX W HCPCS: Performed by: SURGERY

## 2023-02-02 PROCEDURE — 3600000004 HC SURGERY LEVEL 4 BASE: Performed by: SURGERY

## 2023-02-02 PROCEDURE — 74300 X-RAY BILE DUCTS/PANCREAS: CPT

## 2023-02-02 RX ORDER — HYDROCODONE BITARTRATE AND ACETAMINOPHEN 5; 325 MG/1; MG/1
2 TABLET ORAL EVERY 4 HOURS PRN
Status: DISCONTINUED | OUTPATIENT
Start: 2023-02-02 | End: 2023-02-04 | Stop reason: HOSPADM

## 2023-02-02 RX ORDER — HYDROCODONE BITARTRATE AND ACETAMINOPHEN 5; 325 MG/1; MG/1
1 TABLET ORAL EVERY 4 HOURS PRN
Status: DISCONTINUED | OUTPATIENT
Start: 2023-02-02 | End: 2023-02-04 | Stop reason: HOSPADM

## 2023-02-02 RX ORDER — ONDANSETRON 2 MG/ML
INJECTION INTRAMUSCULAR; INTRAVENOUS PRN
Status: DISCONTINUED | OUTPATIENT
Start: 2023-02-02 | End: 2023-02-02 | Stop reason: SDUPTHER

## 2023-02-02 RX ORDER — MAGNESIUM HYDROXIDE 1200 MG/15ML
LIQUID ORAL CONTINUOUS PRN
Status: COMPLETED | OUTPATIENT
Start: 2023-02-02 | End: 2023-02-02

## 2023-02-02 RX ORDER — SUCCINYLCHOLINE/SOD CL,ISO/PF 200MG/10ML
SYRINGE (ML) INTRAVENOUS PRN
Status: DISCONTINUED | OUTPATIENT
Start: 2023-02-02 | End: 2023-02-02 | Stop reason: SDUPTHER

## 2023-02-02 RX ORDER — ROCURONIUM BROMIDE 10 MG/ML
INJECTION, SOLUTION INTRAVENOUS PRN
Status: DISCONTINUED | OUTPATIENT
Start: 2023-02-02 | End: 2023-02-02 | Stop reason: SDUPTHER

## 2023-02-02 RX ORDER — DEXAMETHASONE SODIUM PHOSPHATE 4 MG/ML
INJECTION, SOLUTION INTRA-ARTICULAR; INTRALESIONAL; INTRAMUSCULAR; INTRAVENOUS; SOFT TISSUE PRN
Status: DISCONTINUED | OUTPATIENT
Start: 2023-02-02 | End: 2023-02-02 | Stop reason: SDUPTHER

## 2023-02-02 RX ORDER — HYDROMORPHONE HCL 110MG/55ML
PATIENT CONTROLLED ANALGESIA SYRINGE INTRAVENOUS PRN
Status: DISCONTINUED | OUTPATIENT
Start: 2023-02-02 | End: 2023-02-02 | Stop reason: SDUPTHER

## 2023-02-02 RX ORDER — PROPOFOL 10 MG/ML
INJECTION, EMULSION INTRAVENOUS PRN
Status: DISCONTINUED | OUTPATIENT
Start: 2023-02-02 | End: 2023-02-02 | Stop reason: SDUPTHER

## 2023-02-02 RX ORDER — BUPIVACAINE HYDROCHLORIDE AND EPINEPHRINE 5; 5 MG/ML; UG/ML
INJECTION, SOLUTION EPIDURAL; INTRACAUDAL; PERINEURAL
Status: COMPLETED | OUTPATIENT
Start: 2023-02-02 | End: 2023-02-02

## 2023-02-02 RX ORDER — LIDOCAINE HYDROCHLORIDE 20 MG/ML
INJECTION, SOLUTION EPIDURAL; INFILTRATION; INTRACAUDAL; PERINEURAL PRN
Status: DISCONTINUED | OUTPATIENT
Start: 2023-02-02 | End: 2023-02-02 | Stop reason: SDUPTHER

## 2023-02-02 RX ORDER — SODIUM CHLORIDE, SODIUM LACTATE, POTASSIUM CHLORIDE, CALCIUM CHLORIDE 600; 310; 30; 20 MG/100ML; MG/100ML; MG/100ML; MG/100ML
INJECTION, SOLUTION INTRAVENOUS CONTINUOUS PRN
Status: COMPLETED | OUTPATIENT
Start: 2023-02-02 | End: 2023-02-02

## 2023-02-02 RX ORDER — MIDAZOLAM HYDROCHLORIDE 1 MG/ML
INJECTION INTRAMUSCULAR; INTRAVENOUS PRN
Status: DISCONTINUED | OUTPATIENT
Start: 2023-02-02 | End: 2023-02-02 | Stop reason: SDUPTHER

## 2023-02-02 RX ORDER — FENTANYL CITRATE 50 UG/ML
INJECTION, SOLUTION INTRAMUSCULAR; INTRAVENOUS PRN
Status: DISCONTINUED | OUTPATIENT
Start: 2023-02-02 | End: 2023-02-02 | Stop reason: SDUPTHER

## 2023-02-02 RX ADMIN — HYDROMORPHONE HYDROCHLORIDE 0.5 MG: 2 INJECTION, SOLUTION INTRAMUSCULAR; INTRAVENOUS; SUBCUTANEOUS at 08:44

## 2023-02-02 RX ADMIN — ROCURONIUM BROMIDE 40 MG: 10 INJECTION, SOLUTION INTRAVENOUS at 07:52

## 2023-02-02 RX ADMIN — MIDAZOLAM 1 MG: 1 INJECTION INTRAMUSCULAR; INTRAVENOUS at 07:33

## 2023-02-02 RX ADMIN — LIDOCAINE HYDROCHLORIDE 100 MG: 20 INJECTION, SOLUTION EPIDURAL; INFILTRATION; INTRACAUDAL; PERINEURAL at 07:43

## 2023-02-02 RX ADMIN — ONDANSETRON 4 MG: 2 INJECTION INTRAMUSCULAR; INTRAVENOUS at 07:47

## 2023-02-02 RX ADMIN — METOPROLOL SUCCINATE 100 MG: 50 TABLET, EXTENDED RELEASE ORAL at 10:05

## 2023-02-02 RX ADMIN — HYDRALAZINE HYDROCHLORIDE 25 MG: 25 TABLET, FILM COATED ORAL at 06:16

## 2023-02-02 RX ADMIN — HYDROCODONE BITARTRATE AND ACETAMINOPHEN 2 TABLET: 5; 325 TABLET ORAL at 16:26

## 2023-02-02 RX ADMIN — SODIUM CHLORIDE 50 ML: 9 INJECTION, SOLUTION INTRAVENOUS at 18:23

## 2023-02-02 RX ADMIN — HYDROMORPHONE HYDROCHLORIDE 0.5 MG: 2 INJECTION, SOLUTION INTRAMUSCULAR; INTRAVENOUS; SUBCUTANEOUS at 08:05

## 2023-02-02 RX ADMIN — MORPHINE SULFATE 2 MG: 2 INJECTION, SOLUTION INTRAMUSCULAR; INTRAVENOUS at 14:06

## 2023-02-02 RX ADMIN — SUGAMMADEX 200 MG: 100 INJECTION, SOLUTION INTRAVENOUS at 08:36

## 2023-02-02 RX ADMIN — PIPERACILLIN AND TAZOBACTAM 3375 MG: 3; .375 INJECTION, POWDER, FOR SOLUTION INTRAVENOUS at 18:24

## 2023-02-02 RX ADMIN — Medication 10 ML: at 12:28

## 2023-02-02 RX ADMIN — HYDRALAZINE HYDROCHLORIDE 25 MG: 25 TABLET, FILM COATED ORAL at 20:50

## 2023-02-02 RX ADMIN — FENTANYL CITRATE 50 MCG: 50 INJECTION, SOLUTION INTRAMUSCULAR; INTRAVENOUS at 07:41

## 2023-02-02 RX ADMIN — PIPERACILLIN AND TAZOBACTAM 3375 MG: 3; .375 INJECTION, POWDER, FOR SOLUTION INTRAVENOUS at 10:14

## 2023-02-02 RX ADMIN — Medication 120 MG: at 07:43

## 2023-02-02 RX ADMIN — ROCURONIUM BROMIDE 10 MG: 10 INJECTION, SOLUTION INTRAVENOUS at 07:43

## 2023-02-02 RX ADMIN — FENTANYL CITRATE 50 MCG: 50 INJECTION, SOLUTION INTRAMUSCULAR; INTRAVENOUS at 08:03

## 2023-02-02 RX ADMIN — LOSARTAN POTASSIUM 100 MG: 100 TABLET, FILM COATED ORAL at 10:05

## 2023-02-02 RX ADMIN — ENOXAPARIN SODIUM 40 MG: 100 INJECTION SUBCUTANEOUS at 14:06

## 2023-02-02 RX ADMIN — DEXAMETHASONE SODIUM PHOSPHATE 8 MG: 4 INJECTION, SOLUTION INTRAMUSCULAR; INTRAVENOUS at 07:47

## 2023-02-02 RX ADMIN — MIDAZOLAM 1 MG: 1 INJECTION INTRAMUSCULAR; INTRAVENOUS at 07:40

## 2023-02-02 RX ADMIN — HYDRALAZINE HYDROCHLORIDE 25 MG: 25 TABLET, FILM COATED ORAL at 14:24

## 2023-02-02 RX ADMIN — PROPOFOL 150 MG: 10 INJECTION, EMULSION INTRAVENOUS at 07:43

## 2023-02-02 RX ADMIN — ATORVASTATIN CALCIUM 80 MG: 80 TABLET, FILM COATED ORAL at 20:50

## 2023-02-02 RX ADMIN — PIPERACILLIN AND TAZOBACTAM 3375 MG: 3; .375 INJECTION, POWDER, FOR SOLUTION INTRAVENOUS at 01:29

## 2023-02-02 RX ADMIN — MORPHINE SULFATE 2 MG: 2 INJECTION, SOLUTION INTRAMUSCULAR; INTRAVENOUS at 10:16

## 2023-02-02 RX ADMIN — Medication 10 ML: at 20:50

## 2023-02-02 ASSESSMENT — PAIN DESCRIPTION - DESCRIPTORS
DESCRIPTORS: ACHING
DESCRIPTORS: DISCOMFORT
DESCRIPTORS: ACHING

## 2023-02-02 ASSESSMENT — PAIN DESCRIPTION - ORIENTATION
ORIENTATION: RIGHT

## 2023-02-02 ASSESSMENT — PAIN DESCRIPTION - ONSET: ONSET: ON-GOING

## 2023-02-02 ASSESSMENT — PAIN DESCRIPTION - FREQUENCY: FREQUENCY: CONTINUOUS

## 2023-02-02 ASSESSMENT — PAIN SCALES - GENERAL
PAINLEVEL_OUTOF10: 7
PAINLEVEL_OUTOF10: 8
PAINLEVEL_OUTOF10: 8
PAINLEVEL_OUTOF10: 3

## 2023-02-02 ASSESSMENT — PAIN DESCRIPTION - LOCATION
LOCATION: ABDOMEN

## 2023-02-02 ASSESSMENT — PAIN - FUNCTIONAL ASSESSMENT
PAIN_FUNCTIONAL_ASSESSMENT: PREVENTS OR INTERFERES SOME ACTIVE ACTIVITIES AND ADLS
PAIN_FUNCTIONAL_ASSESSMENT: NONE - DENIES PAIN

## 2023-02-02 ASSESSMENT — PAIN DESCRIPTION - PAIN TYPE: TYPE: SURGICAL PAIN

## 2023-02-02 NOTE — PROGRESS NOTES
Patient back up to unit and stable. Vitals within normal limits. Pain medication given and patient is resting in bed with family at bedside.

## 2023-02-02 NOTE — H&P
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 33    TUBAL LIGATION  33       Social History     Socioeconomic History    Marital status:      Spouse name: Alfonzo Campbell    Number of children: 1    Years of education: 18    Highest education level: Not on file   Occupational History    Not on file   Tobacco Use    Smoking status: Every Day     Packs/day: 1.00     Types: Cigarettes    Smokeless tobacco: Not on file    Tobacco comments:     Started: 1965: does not smoke more than 5 cigarettes in a day   Vaping Use    Vaping Use: Never used   Substance and Sexual Activity    Alcohol use: Yes     Comment: 4 drinks a month    Drug use: No    Sexual activity: Not Currently   Other Topics Concern    Not on file   Social History Narrative    Not on file     Social Determinants of Health     Financial Resource Strain: Not on file   Food Insecurity: Not on file   Transportation Needs: Not on file   Physical Activity: Not on file   Stress: Not on file   Social Connections: Not on file   Intimate Partner Violence: Not on file   Housing Stability: Not on file       Allergies: Allergies   Allergen Reactions    Cephalexin Anaphylaxis    Penicillins Swelling       Prior to Admission medications    Medication Sig Start Date End Date Taking?  Authorizing Provider   losartan (COZAAR) 100 MG tablet Take 100 mg by mouth daily   Yes Historical Provider, MD   pantoprazole (PROTONIX) 40 MG tablet TAKE 1 TABLET BY MOUTH EVERY DAY 1/18/23  Yes Historical Provider, MD   Omega-3 Fatty Acids (OMEGA-3 FISH OIL) 300 MG CAPS Take 300 mg by mouth daily   Yes Historical Provider, MD   atorvastatin (LIPITOR) 40 MG tablet Take 80 mg by mouth at bedtime 11/4/22   Historical Provider, MD   hydrALAZINE (APRESOLINE) 25 MG tablet TAKE 1 TABLET (25 MG TOTAL) BY MOUTH 2 TIMES A DAY. FOR HTN 1/18/23   Historical Provider, MD   loratadine (CLARITIN) 10 MG tablet Take 10 mg by mouth daily    Historical Provider, MD   metoprolol succinate (TOPROL XL) 100 MG extended release tablet Take 100 mg by mouth daily 1/31/23   Historical Provider, MD   JANUVIA 50 MG tablet TAKE 1 TABLET BY MOUTH EVERY DAY 1/11/23   Historical Provider, MD   hydroCHLOROthiazide (HYDRODIURIL) 25 MG tablet TAKE 1 TABLET BY MOUTH EVERY DAY 1/9/23   Historical Provider, MD   lisinopril (PRINIVIL;ZESTRIL) 40 MG tablet Take 1 tablet by mouth daily. Patient not taking: Reported on 2/1/2023 11/12/10   Yajaira Tamez MD   aspirin 81 MG EC tablet Take 81 mg by mouth daily. Historical Provider, MD       Principal Problem:    Acute cholecystitis  Resolved Problems:    * No resolved hospital problems. *      Blood pressure (!) 158/42, pulse 58, temperature 97.1 °F (36.2 °C), temperature source Temporal, resp. rate 18, height 5' 4\" (1.626 m), weight 195 lb (88.5 kg), SpO2 97 %. Review of Systems    Physical Exam  Constitutional:       Appearance: She is well-developed. HENT:      Head: Normocephalic and atraumatic. Right Ear: External ear normal.      Left Ear: External ear normal.   Eyes:      Conjunctiva/sclera: Conjunctivae normal.   Cardiovascular:      Rate and Rhythm: Normal rate and regular rhythm. Pulmonary:      Effort: Pulmonary effort is normal.      Breath sounds: Normal breath sounds. Abdominal:      General: There is no distension. Palpations: Abdomen is soft. Tenderness: There is no abdominal tenderness. Musculoskeletal:         General: Normal range of motion.       Cervical back: Normal range of motion and neck supple. Skin:     General: Skin is warm and dry. Neurological:      Mental Status: She is alert and oriented to person, place, and time. Psychiatric:         Behavior: Behavior normal.       Assessment:  Symptomatic cholelithiasis    Plan:  Laparoscopic cholecystectomy. Patient explained the risks,benefits and possible complications including bleeding, bowel injury, cbd injury or hepatic artery injury.             Evan Posada MD  2/2/2023

## 2023-02-02 NOTE — PROGRESS NOTES
Pt resting quietly in bed with eyes closed, awakens to voice. VSS, O2 sats 99% on 3 L NC. Incisions to abdomen remain CDI, ice pack in place. Pt seen by anesthesia, phase 1 criteria met. Will transfer pt to 5T.

## 2023-02-02 NOTE — PROGRESS NOTES
Patient arrived in preop bay 14 in stable condition, teaching / education initiated regarding perioperative experience.

## 2023-02-02 NOTE — PROGRESS NOTES
[facility]   Phone: [phone]    Occupational Therapy    [x] Initial Evaluation            [] Daily Treatment Note         [] Discharge Summary      Patient: Los Rutherford   : 1950   MRN: [id number]   Date of Service:  2023    Admitting Diagnosis:  Acute cholecystitis  Current Admission Summary: Per H&P, Ms. Rajiv Bland is a 71 y.o. female who presented to the ED with constant, achy, upper abdominal pain that radiates into the chest and through to the back and began this morning around 0500. She reports that it's more of a discomfort than pain and rates the intensity a 5 out of 10. She tried Tylenol without relief, pain is better when lying still; no aggravating factors. Reports being unable to sleep after onset, just \"couldn't get comfortable\". Associated nausea and chills. She denies fevers, vomiting, or weight loss. She did have one similar episode about 6 months ago, but reports did not last as long. Only occasional alcohol intake; reports maybe one mixed drink within the last week. New medications includes Hydralazine, which she reports starting in 2022. Colonoscopy last in 2019. Prior abdominal surgery includes tubal ligation and hysterectomy for uterine cancer. No blood thinners. Current smoker. Past Medical History:  has a past medical history of Allergic rhinitis, Asthma, Colorectal polyps, Diabetes ([address] Hyperlipidemia, Menopause, Obesity, and Thyroid nodule. Past Surgical History:  has a past surgical history that includes Hysterectomy; Tubal ligation (28); Colonoscopy; and Cholecystectomy, laparoscopic (N/A, 2023). Discharge Recommendations: Los Rutherford scored a 21/ on the AM-PAC ADL Inpatient form. At this time, no further OT is recommended upon discharge due to pt expected to be at her baseline level of occupational function. Recommend patient returns to prior setting.     If patient discharges prior to next session this note will serve as a discharge summary. Please see below for the latest assessment towards goals. DME Required For Discharge: no DME required at discharge    Precautions/Restrictions: medium fall risk  Weight Bearing Restrictions: no restrictions  [] Right Upper Extremity  [] Left Upper Extremity [] Right Lower Extremity  [] Left Lower Extremity     Required Braces/Orthotics: no braces required   [] Right  [] Left  Positional Restrictions:no positional restrictions    Pre-Admission Information   Lives With: spouse - uses SPC, cannot provide increased assistance  Son and DIL live down the street, able to assist as needed  Type of Home: house  Home Layout: one level, able to live on main level, with basement  Home Access:  3 step to enter with handrail. Handrails are located on both side. Bathroom Layout:  master bath is walk-in, but pt uses tub/shower   Bathroom Equipment: grab bars in shower, hand held shower head  Toilet Height: standard height  Home Equipment: reacher  Transfer Assistance: Independent without use of device  Ambulation Assistance: Independent without use of device  ADL Assistance: independent with all ADL's  IADL Assistance: independent with homemaking tasks              She is her 's caregiver - assist with socks/shoes, manages doctor's appointment  Active :        [x] Yes                 [] No  Hand Dominance: [x] Left                 [] Right  Current Employment: retired. Occupation:   [employer]: [address] [place] [address] Avenue: No falls in last 6 months    Examination   Vision:   Vision Gross Assessment: WFL  Hearing:   WFL - some difficulty with low voices  Perception:   WFL  Observation:   General Observation:  NC not on upon therapist arrival, SpO2 95% on RA.  Pt has IV in R arm  Sensation:   WFL - reports intermittent numbness in B feet, pt suspects it is neuropathy   Proprioception:    WFL  Tone:   Normotonic  Coordination Testing: Alternating Pronation/Supination: WFL  Finger/Thumb Opposition: WFL    ROM:   (B) UE AROM WFL  Strength:   (B) UE strength grossly 5    Therapist Clinical Decision Making (Complexity): low complexity  Clinical Presentation: stable      Subjective  General: Pt sleeping upon therapy arrival. Once awakened, pt pleasant and agreeable to OT/PT eval. Pt reported wanting to walk and participate in ADLs  Pain: 7/10. Location: R abdomen, right under rib cage  Pain Interventions: pain medication in place prior to arrival        Activities of Daily Living  Basic Activities of Daily Living  Grooming: stand by assistance  Grooming Comments: Pt brushed teeth, washed face, and combed hair in stance at sink  Lower Extremity Bathing: stand by assistance   Bathing Comments: fanny area only  Lower Extremity Dressing: contact guard assistance  Dressing Comments: Pt donned socks seated EOB with SBA for leaning forward. Pt donned underwear while standing in restroom with CGA; pt held onto grab bars to bend to reach feet. Educated pt regarding sitting for safety with LB dressing  Toileting: supervision. Toileting Comments: Pt urinated in toilet  Instrumental Activities of Daily Living  No IADL completed on this date. Functional Mobility  Bed Mobility  Supine to Sit: stand by assistance  Rolling Right: stand by assistance  Comments:  Transfers  Sit to stand transfer:stand by assistance  Stand to sit transfer: stand by assistance  Stand step transfer: stand by assistance  Toilet transfer: stand by assistance  Toilet transfer comments: Pt used grab bars while sitting   Comments: Decreased eccentric control when sitting down into recliner  Functional Mobility:  Sitting Balance: stand by assistance. Sitting Balance Comment: pt sat EOB for ~5 min  Standing Balance: stand by assistance.     Standing Balance Comment: Pt stood at sink for ADLs for ~15 min  Functional Mobility: .  stand by assistance  Functional Mobility Activity: around unit  Functional Mobility Comment: ~350 ft. Pt pushed IV pole for first 150 ft. Steady gait and pace. Other Therapeutic Interventions    Functional Outcomes  AM-PAC Inpatient Daily Activity Raw Score: 21    Cognition  WFL  Orientation:    alert and oriented x 4  Command Following:   [facility]     Education  Barriers To Learning: none  Patient Education: patient educated on OT role and benefits, plan of care, transfer training, discharge recommendations, LB dressing safety  Learning Assessment:  patient verbalizes and demonstrates understanding    Assessment  Activity Tolerance: Pt tolerated session well. Pt reported minimal pain in abdomen during activities. Pt eager to participate in therapeutic activities  Impairments Requiring Therapeutic Intervention: decreased functional mobility, decreased ADL status, decreased endurance  Prognosis: good  Clinical Assessment: Pt is a 71 y.o. female admitted for abdominal pain and is now s/p lap maurilio on 2/2/23. Pt reported being independent with ADLs and functional mobility at her baseline level of occupational function. Today, pt required SBA for ADL and functional mobility activities. Pt eager and motivated to participate in therapy. It is recommended that pt continues to receive skilled acute OT services to maximize her safety and independence before returning home.   Safety Interventions: patient left in chair, call light within reach, gait belt, patient at risk for falls, and nurse notified    Plan  Frequency: 1-2 x/per week  Current Treatment Recommendations: functional mobility training, transfer training, endurance training, and ADL/self-care training    Goals  Patient Goals: to return home   Short Term Goals:  Time Frame: discharge  Patient will complete lower body ADL at Independent   Patient will complete functional transfers at Independent   Patient will complete functional mobility at Independent   Patient will increase functional standing balance to independent for improved ADL completion  Patient will complete home mgt task at independent     Therapy Session Time     Individual Group Co-treatment   Time In    1422   Time Out    1545   Minutes    83        Timed Code Treatment Minutes:   68  Total Treatment Minutes:  83       Electronically Signed By: TOSHIA Meza, S/OT  I have directly observed this treatment and have read and approve this note.    Jorge Aguayo., OTR/L, [id number]

## 2023-02-02 NOTE — PROGRESS NOTES
Pt arrived from OR to PACU, arouses to voice. VSS, O2 sats 100% on 6 L simple mask. Incisions to abdomen dry and intact, abdomen soft, ice pack in place. Will monitor.

## 2023-02-02 NOTE — ANESTHESIA POSTPROCEDURE EVALUATION
Department of Anesthesiology  Postprocedure Note    Patient: Fatemeh Ayers  MRN: [id number]  YOB: 1950  Date of evaluation: 2/2/2023      Procedure Summary     Date: 02/02/23 Room / Location: [address]    Anesthesia Start: 6404 Anesthesia Stop: 4232    Procedure: LAPAROSCOPIC CHOLECYSTECTOMY WITH INTRAOPERATIVE CHOLANGIOGRAM (Abdomen) Diagnosis:       Acute cholecystitis      (Cholecystitis)    Surgeons: Rickie Azevedo MD Responsible Provider: Denise Rouse MD    Anesthesia Type: general ASA Status: 2          Anesthesia Type: No value filed.     Michael Phase I: Michael Score: 8    Michael Phase II:        Anesthesia Post Evaluation    Patient location during evaluation: PACU  Patient participation: complete - patient participated  Level of consciousness: awake and alert  Pain score: 2  Airway patency: patent  Nausea & Vomiting: no vomiting  Complications: no  Cardiovascular status: blood pressure returned to baseline  Respiratory status: acceptable  Hydration status: euvolemic  Multimodal analgesia pain management approach

## 2023-02-02 NOTE — PROGRESS NOTES
[facility]   Phone: [phone]    Physical Therapy    [x] Initial Evaluation            [] Daily Treatment Note         [] Discharge Summary      Patient: Wilmer Hernandez   : 1950   MRN: [id number]   Date of Service:  2023  Admitting Diagnosis: Acute cholecystitis  Current Admission Summary: Per H&P on  \"77 y.o. female   With feeling uncomfortable dull discomfort of radiating to her back and nausea but no vomiting no diarrhea no fever no chills no shortness of breath. In the ED patient had imaging that was suggestive of cholecystitis. \"   - lap cholecystectomy  Past Medical History:  has a past medical history of Allergic rhinitis, Asthma, Colorectal polyps, Diabetes ([address] Hyperlipidemia, Menopause, Obesity, and Thyroid nodule. Past Surgical History:  has a past surgical history that includes Hysterectomy; Tubal ligation (28); Colonoscopy; and Cholecystectomy, laparoscopic (N/A, 2023). Discharge Recommendations: Wilmer Hernandez scored a 18/24 on the AM-PAC short mobility form. At this time, no further PT is recommended upon discharge due to pt functioning near her baseline. Recommend patient returns to prior setting with prior services. DME Required For Discharge: no DME required at discharge    Goes by Leena  Precautions/Restrictions: medium fall risk, NPO  Weight Bearing Restrictions: no restrictions  [] Right Upper Extremity  [] Left Upper Extremity [] Right Lower Extremity  [] Left Lower Extremity     Required Braces/Orthotics: no braces required   [] Right  [] Left  Positional Restrictions:no positional restrictions    Pre-Admission Information   Lives With: spouse - uses SPC, cannot provide increased assistance  Son and DIL live down the street, able to assist as needed  Type of Home: house  Home Layout: one level, able to live on main level, with basement  Home Access:  3 step to enter with handrail.   Handrails are located on both side. Bathroom Layout:  master bath is walk-in, but pt uses tub/shower   Bathroom Equipment: grab bars in shower, hand held shower head  Toilet Height: standard height  Home Equipment: reacher  Transfer Assistance: Independent without use of device  Ambulation Assistance: Independent without use of device  ADL Assistance: independent with all ADL's  IADL Assistance: independent with homemaking tasks   She is her 's caregiver - assist with socks/shoes, manages doctor's appointment  Active :        [x] Yes  [] No  Hand Dominance: [x] Left  [] Right  Current Employment: retired. Occupation:   [employer]: [address] [place] [address] Avenue: No falls in last 6 months    Examination   Vision:   Vision Gross Assessment: WFL  Hearing:   WFL - some difficulty with low voices  Observation:   General Observation:  NC not on upon therapist arrival, SpO2 95% on RA  Posture:   Good  Sensation:   WFL - reports intermittent numbness in (B) feet, pt suspects it is neuropathy. ROM:   (B) LE AROM WFL  Strength:   (B) LE strength grossly 5/5 hip flexion, knee flex/ext, ankle DF  Therapist Clinical Decision Making (Complexity): low complexity  Clinical Presentation: stable      Subjective  General: Patient semi-reclined in bed upon therapist arrival. Pt a bit groggy from her procedure earlier and napping but agreeable to PT/OT. Very excited about getting cleaned up. Pain: 7/10. Location: (R) abdomen, right under rib cage  Pain Interventions: pain medication in place prior to arrival       Functional Mobility  Bed Mobility  Supine to Sit: stand by assistance  Rolling Right: stand by assistance  Scooting: stand by assistance  Comments: Bed flat. Cues for use of log roll technique to limit abdominal discomfort.    Transfers  Sit to stand transfer: stand by assistance  Stand to sit transfer: stand by assistance  Toilet transfer: stand by assistance, with (L) grab bar  Comments:  Ambulation  Surface:level surface  Assistive Device: no device  Assistance: stand by assistance  Distance: 10 ft + 10 ft + 400 ft  Gait Mechanics: Wide ANTHONY, slow monique  Comments:  Pt a bit unsteady initially but balance improves rapidly with distance. Pt ambulated to/from bathroom then 1 lap in hallway. Pt managed IV pole for first 150 ft in hallway with mild forward lean, posture improved with therapist management of IV pole, no change in pt's balance. Stair Mobility  Stair mobility not completed on this date. Comments:  Wheelchair Mobility:  No w/c mobility completed on this date. Comments:  Balance  Static Sitting Balance: good: independent with functional balance in unsupported position  Dynamic Sitting Balance: fair (+): maintains balance at SBA/supervision without use of UE support  Static Standing Balance: fair (+): maintains balance at SBA/supervision without use of UE support  Dynamic Standing Balance: fair (+): maintains balance at SBA/supervision without use of UE support  Comments: Independent for static sitting balance. Mild posterior lean noted with lower body MMTs when sitting EOB but no LOB and pt able to return to midline. Pt stood at sink x12 minutes for ADLs without UE support, no sway noted. Other Therapeutic Interventions  See OT note for assist with ADLs and toileting. Functional Outcomes  AM-PAC Inpatient Mobility Raw Score : 18              Cognition  WFL  Orientation:    alert and oriented x 4  Command Following:   [facility]    Education  Barriers To Learning: none  Patient Education: patient educated on goals, PT role and benefits, plan of care, general safety, functional mobility training, disease specific education, transfer training, discharge recommendations  Learning Assessment:  patient verbalizes and demonstrates understanding    Assessment  Activity Tolerance: No adverse symptoms or reactions to activity.   Impairments Requiring Therapeutic Intervention: decreased functional mobility, decreased endurance, decreased balance, increased pain  Prognosis: good  Clinical Assessment: Patient is a 70 yo female admitted to [facility] for abdominal pain seen now s/p ramirez thorpe on 2/2/23. The patient was a bit groggy initially but her level of alertness improved with activity. The patient's balance was also mildly impaired with ambulation to/from bathroom but improved with longer ambulation in hallway. The patient assists her spouse with his ADLs at baseline and performs all household IADLs. The patient is anticipated to make a steady recovery but would benefit from continued skilled PT to safely promote return to prior level of independence. Safety Interventions: patient left in chair, call light within reach, gait belt, and nurse notified    Plan  Frequency: 1-2 x/per week  Current Treatment Recommendations: balance training, functional mobility training, transfer training, gait training, stair training, endurance training, patient/caregiver education, home exercise program, safety education, and positioning    Goals  Patient Goals: Go home tomorrow  Short Term Goals:  Time Frame: Before discharge  Patient will complete bed mobility at Independent   Patient will complete transfers at Independent   Patient will ambulate 400 ft with use of no device at Independent  Patient will ascend/descend 3 stairs with (B) handrail at modified independent  Patient to maintain standing at Independent for 15 minutes without UE support in order to complete IADLs.     Therapy Session Time      Individual Group Co-treatment   Time In     1873   Time Out     1545   Minutes     83     Timed Code Treatment Minutes:  68 Minutes  Total Treatment Minutes:  83 minutes       Electronically Signed By: Milton Delacruz, PT      Rissa Allan PT, DPT #[id number]

## 2023-02-02 NOTE — ANESTHESIA PRE PROCEDURE
Department of Anesthesiology  Preprocedure Note       Name:  Disha Diego   Age:  71 y.o.  :  1950                                          MRN:  [id number]         Date:  2023      Surgeon: Fabian Gutierrez):  Fredrick Iverson MD    Procedure: Procedure(s):  LAPAROSCOPIC CHOLECYSTECTOMY WITH INTRAOPERATIVE CHOLANGIOGRAM    Medications prior to admission:   Prior to Admission medications    Medication Sig Start Date End Date Taking? Authorizing Provider   losartan (COZAAR) 100 MG tablet Take 100 mg by mouth daily   Yes Historical Provider, MD   pantoprazole (PROTONIX) 40 MG tablet TAKE 1 TABLET BY MOUTH EVERY DAY 23  Yes Historical Provider, MD   Omega-3 Fatty Acids (OMEGA-3 FISH OIL) 300 MG CAPS Take 300 mg by mouth daily   Yes Historical Provider, MD   atorvastatin (LIPITOR) 40 MG tablet Take 80 mg by mouth at bedtime 22   Historical Provider, MD   hydrALAZINE (APRESOLINE) 25 MG tablet TAKE 1 TABLET (25 MG TOTAL) BY MOUTH 2 TIMES A DAY. FOR HTN 23   Historical Provider, MD   loratadine (CLARITIN) 10 MG tablet Take 10 mg by mouth daily    Historical Provider, MD   metoprolol succinate (TOPROL XL) 100 MG extended release tablet Take 100 mg by mouth daily 23   Historical Provider, MD   JANUVIA 50 MG tablet TAKE 1 TABLET BY MOUTH EVERY DAY 23   Historical Provider, MD   hydroCHLOROthiazide (HYDRODIURIL) 25 MG tablet TAKE 1 TABLET BY MOUTH EVERY DAY 23   Historical Provider, MD   lisinopril (PRINIVIL;ZESTRIL) 40 MG tablet Take 1 tablet by mouth daily. Patient not taking: Reported on 2023 11/12/10   Parviz Oakes MD   aspirin 81 MG EC tablet Take 81 mg by mouth daily.     Historical Provider, MD       Current medications:    Current Facility-Administered Medications   Medication Dose Route Frequency Provider Last Rate Last Admin    aspirin EC tablet 81 mg  81 mg Oral Daily Amy King MD        atorvastatin (LIPITOR) tablet 80 mg  80 mg Oral Nightly Amy King MD   80 mg at 02/01/23 2125   • hydrALAZINE (APRESOLINE) tablet 25 mg  25 mg Oral 3 times per day John Hartman MD   25 mg at 02/02/23 0616   • losartan (COZAAR) tablet 100 mg  100 mg Oral Daily John Hartman MD   100 mg at 02/01/23 1700   • metoprolol succinate (TOPROL XL) extended release tablet 100 mg  100 mg Oral Daily John Hartman MD   100 mg at 02/01/23 1700   • piperacillin-tazobactam (ZOSYN) 3,375 mg in sodium chloride 0.9 % 50 mL IVPB (mini-bag)  3,375 mg IntraVENous Q8H John Hartman MD   Stopped at 02/02/23 0530   • morphine (PF) injection 2 mg  2 mg IntraVENous Q4H PRN John Hartman MD       • sodium chloride flush 0.9 % injection 5-40 mL  5-40 mL IntraVENous 2 times per day John Hartman MD   10 mL at 02/01/23 2125   • sodium chloride flush 0.9 % injection 5-40 mL  5-40 mL IntraVENous PRN John Hartman MD       • 0.9 % sodium chloride infusion   IntraVENous PRN John Hartman  mL/hr at 02/02/23 0709 NoRateChange at 02/02/23 0709   • enoxaparin (LOVENOX) injection 40 mg  40 mg SubCUTAneous Daily John Hartman MD       • ondansetron (ZOFRAN-ODT) disintegrating tablet 4 mg  4 mg Oral Q8H PRN John Hartman MD        Or   • ondansetron (ZOFRAN) injection 4 mg  4 mg IntraVENous Q6H PRN John Hartman MD       • polyethylene glycol (GLYCOLAX) packet 17 g  17 g Oral Daily PRN John Hartman MD       • acetaminophen (TYLENOL) tablet 650 mg  650 mg Oral Q6H PRN John Hartman MD        Or   • acetaminophen (TYLENOL) suppository 650 mg  650 mg Rectal Q6H PRN John Hartman MD           Allergies:    Allergies   Allergen Reactions   • Cephalexin Anaphylaxis   • Penicillins Swelling       Problem List:    Patient Active Problem List   Diagnosis Code   • New daily persistent headache G44.52   • Hypertension, benign I10   • Atypical chest pain R07.89   • Adenocarcinoma of uterus (HCC) C55   • Adenomatous polyp of colon D12.6   • Bronchitis, chronic (HCC) J42   Smoker F17.200    Environmental allergies Z91.09    Asthma J45.909    Hyperlipidemia E78.5    Acute cholecystitis K81.0       Past Medical History:        Diagnosis Date    Allergic rhinitis     Environmental allergies    Asthma     Colorectal polyps     Diabetes ([address]     type 2    Hyperlipidemia     Menopause     Onset of menopause at age 30    Obesity     Thyroid nodule        Past Surgical History:        Procedure Laterality Date    COLONOSCOPY      HYSTERECTOMY (CERVIX STATUS UNKNOWN)      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 age 33   Orvan Huguenin TUBAL LIGATION  33       Social History:    Social History     Tobacco Use    Smoking status: Every Day     Packs/day: 1.00     Types: Cigarettes    Smokeless tobacco: Not on file    Tobacco comments:     Started: 1965: does not smoke more than 5 cigarettes in a day   Substance Use Topics    Alcohol use: Yes     Comment: 4 drinks a month                                Ready to quit: Yes  Counseling given: Yes  Tobacco comments: Started: 1965: does not smoke more than 5 cigarettes in a day      Vital Signs (Current):   Vitals:    02/01/23 2050 02/02/23 0115 02/02/23 0415 02/02/23 0712   BP: (!) 165/76 (!) 147/61 (!) 156/68 (!) 158/42   Pulse: 58 62 59 58   Resp: 16 18 18 18   Temp: 98.1 °F (36.7 °C) 98 °F (36.7 °C) 98.6 °F (37 °C) 97.1 °F (36.2 °C)   TempSrc: Oral Oral Oral Temporal   SpO2: 98% 97% 96% 97%   Weight:       Height:                                                  BP Readings from Last 3 Encounters:   02/02/23 (!) 158/42   08/12/10 144/62       NPO Status:                                                                                 BMI:   Wt Readings from Last 3 Encounters:   02/01/23 195 lb (88.5 kg)   08/12/10 188 lb 3.2 oz (85.4 kg)     Body mass index is 33.47 kg/m².     CBC:   Lab Results   Component Value Date/Time    WBC 13.2 02/01/2023 08:45 AM    RBC 3.88 02/01/2023 08:45 AM    HGB 10.8 02/01/2023 08:45 AM    HCT 33.9 02/01/2023 08:45 AM    MCV 87.3 02/01/2023 08:45 AM    RDW 16.8 02/01/2023 08:45 AM     02/01/2023 08:45 AM       CMP:   Lab Results   Component Value Date/Time     02/01/2023 08:45 AM    K 4.1 02/01/2023 08:45 AM     02/01/2023 08:45 AM    CO2 26 02/01/2023 08:45 AM    BUN 26 02/01/2023 08:45 AM    CREATININE 1.1 02/01/2023 08:45 AM    AGRATIO 1.2 02/01/2023 08:45 AM    LABGLOM 54 02/01/2023 08:45 AM    GLUCOSE 113 02/01/2023 08:45 AM    PROT 6.8 02/01/2023 08:45 AM    CALCIUM 9.2 02/01/2023 08:45 AM    BILITOT <0.2 02/01/2023 08:45 AM    ALKPHOS 111 02/01/2023 08:45 AM    AST 17 02/01/2023 08:45 AM    ALT 14 02/01/2023 08:45 AM       POC Tests: No results for input(s): POCGLU, POCNA, POCK, POCCL, POCBUN, POCHEMO, POCHCT in the last 72 hours. Coags: No results found for: PROTIME, INR, APTT    HCG (If Applicable): No results found for: PREGTESTUR, PREGSERUM, HCG, HCGQUANT     ABGs: No results found for: PHART, PO2ART, FIW8UYN, AFF1LUN, BEART, N0LEXGHY     Type & Screen (If Applicable):  No results found for: LABABO, LABRH    Drug/Infectious Status (If Applicable):  No results found for: HIV, HEPCAB    COVID-19 Screening (If Applicable): No results found for: COVID19        Anesthesia Evaluation  Patient summary reviewed and Nursing notes reviewed  Airway: Mallampati: II  TM distance: >3 FB   Neck ROM: full  Mouth opening: > = 3 FB   Dental:          Pulmonary:   (+) asthma:                            Cardiovascular:  Exercise tolerance: good (>4 METS),   (+) hypertension:,                   Neuro/Psych:   (+) headaches:,             GI/Hepatic/Renal:             Endo/Other:    (+) DiabetesType II DM, well controlled, , .                 Abdominal:   (+) obese,           Vascular: Other Findings:           Anesthesia Plan      general     ASA 2       Induction: intravenous and rapid sequence. MIPS: Prophylactic antiemetics administered.   Anesthetic plan and risks discussed with patient. Plan discussed with CRNA.           Post-op pain plan if not by surgeon: single peripheral nerve block            Bertha Canales MD   2/2/2023

## 2023-02-02 NOTE — PLAN OF CARE
Problem: Pain  Goal: Verbalizes/displays adequate comfort level or baseline comfort level  Outcome: Progressing     Problem: Safety - Adult  Goal: Free from fall injury  Outcome: Progressing     Problem: ABCDS Injury Assessment  Goal: Absence of physical injury  Outcome: Progressing     Problem: Chronic Conditions and Co-morbidities  Goal: Patient's chronic conditions and co-morbidity symptoms are monitored and maintained or improved  Outcome: Progressing     Problem: Discharge Planning  Goal: Discharge to home or other facility with appropriate resources  Outcome: Progressing

## 2023-02-03 LAB
A/G RATIO: 1.3 (ref 1.1–2.2)
ALBUMIN SERPL-MCNC: 3.5 G/DL (ref 3.4–5)
ALP BLD-CCNC: 98 U/L (ref 40–129)
ALT SERPL-CCNC: 41 U/L (ref 10–40)
ANION GAP SERPL CALCULATED.3IONS-SCNC: 8 MMOL/L (ref 3–16)
AST SERPL-CCNC: 46 U/L (ref 15–37)
BASOPHILS ABSOLUTE: 0.1 K/UL (ref 0–0.2)
BASOPHILS RELATIVE PERCENT: 1 %
BILIRUB SERPL-MCNC: 0.3 MG/DL (ref 0–1)
BUN BLDV-MCNC: 38 MG/DL (ref 7–20)
CALCIUM SERPL-MCNC: 8.5 MG/DL (ref 8.3–10.6)
CHLORIDE BLD-SCNC: 107 MMOL/L (ref 99–110)
CO2: 24 MMOL/L (ref 21–32)
CREAT SERPL-MCNC: 1.8 MG/DL (ref 0.6–1.2)
EOSINOPHILS ABSOLUTE: 0 K/UL (ref 0–0.6)
EOSINOPHILS RELATIVE PERCENT: 0.1 %
GFR SERPL CREATININE-BSD FRML MDRD: 30 ML/MIN/{1.73_M2}
GLUCOSE BLD-MCNC: 110 MG/DL (ref 70–99)
GLUCOSE BLD-MCNC: 132 MG/DL (ref 70–99)
GLUCOSE BLD-MCNC: 152 MG/DL (ref 70–99)
GLUCOSE BLD-MCNC: 168 MG/DL (ref 70–99)
GLUCOSE BLD-MCNC: 95 MG/DL (ref 70–99)
HCT VFR BLD CALC: 30.3 % (ref 36–48)
HEMOGLOBIN: 9.8 G/DL (ref 12–16)
LIPASE: 230 U/L (ref 13–60)
LYMPHOCYTES ABSOLUTE: 2.2 K/UL (ref 1–5.1)
LYMPHOCYTES RELATIVE PERCENT: 15.8 %
MCH RBC QN AUTO: 28.3 PG (ref 26–34)
MCHC RBC AUTO-ENTMCNC: 32.5 G/DL (ref 31–36)
MCV RBC AUTO: 87.2 FL (ref 80–100)
MONOCYTES ABSOLUTE: 1 K/UL (ref 0–1.3)
MONOCYTES RELATIVE PERCENT: 7.1 %
NEUTROPHILS ABSOLUTE: 10.4 K/UL (ref 1.7–7.7)
NEUTROPHILS RELATIVE PERCENT: 76 %
PDW BLD-RTO: 16.9 % (ref 12.4–15.4)
PERFORMED ON: ABNORMAL
PERFORMED ON: NORMAL
PLATELET # BLD: 192 K/UL (ref 135–450)
PMV BLD AUTO: 10.6 FL (ref 5–10.5)
POTASSIUM REFLEX MAGNESIUM: 4 MMOL/L (ref 3.5–5.1)
RBC # BLD: 3.47 M/UL (ref 4–5.2)
SODIUM BLD-SCNC: 139 MMOL/L (ref 136–145)
TOTAL PROTEIN: 6.3 G/DL (ref 6.4–8.2)
WBC # BLD: 13.7 K/UL (ref 4–11)

## 2023-02-03 PROCEDURE — 1200000000 HC SEMI PRIVATE

## 2023-02-03 PROCEDURE — 80053 COMPREHEN METABOLIC PANEL: CPT

## 2023-02-03 PROCEDURE — 99024 POSTOP FOLLOW-UP VISIT: CPT | Performed by: SURGERY

## 2023-02-03 PROCEDURE — APPSS15 APP SPLIT SHARED TIME 0-15 MINUTES: Performed by: NURSE PRACTITIONER

## 2023-02-03 PROCEDURE — 97116 GAIT TRAINING THERAPY: CPT

## 2023-02-03 PROCEDURE — 97530 THERAPEUTIC ACTIVITIES: CPT

## 2023-02-03 PROCEDURE — 83690 ASSAY OF LIPASE: CPT

## 2023-02-03 PROCEDURE — APPNB30 APP NON BILLABLE TIME 0-30 MINS: Performed by: NURSE PRACTITIONER

## 2023-02-03 PROCEDURE — 6360000002 HC RX W HCPCS: Performed by: SURGERY

## 2023-02-03 PROCEDURE — 2580000003 HC RX 258: Performed by: INTERNAL MEDICINE

## 2023-02-03 PROCEDURE — 2580000003 HC RX 258: Performed by: SURGERY

## 2023-02-03 PROCEDURE — 36415 COLL VENOUS BLD VENIPUNCTURE: CPT

## 2023-02-03 PROCEDURE — 6370000000 HC RX 637 (ALT 250 FOR IP): Performed by: SURGERY

## 2023-02-03 PROCEDURE — 85025 COMPLETE CBC W/AUTO DIFF WBC: CPT

## 2023-02-03 RX ORDER — SODIUM CHLORIDE, SODIUM LACTATE, POTASSIUM CHLORIDE, CALCIUM CHLORIDE 600; 310; 30; 20 MG/100ML; MG/100ML; MG/100ML; MG/100ML
INJECTION, SOLUTION INTRAVENOUS CONTINUOUS
Status: DISCONTINUED | OUTPATIENT
Start: 2023-02-03 | End: 2023-02-04 | Stop reason: HOSPADM

## 2023-02-03 RX ORDER — HYDROCODONE BITARTRATE AND ACETAMINOPHEN 5; 325 MG/1; MG/1
1 TABLET ORAL EVERY 6 HOURS PRN
Qty: 10 TABLET | Refills: 0 | Status: SHIPPED | OUTPATIENT
Start: 2023-02-03 | End: 2023-02-10

## 2023-02-03 RX ADMIN — SODIUM CHLORIDE: 9 INJECTION, SOLUTION INTRAVENOUS at 09:54

## 2023-02-03 RX ADMIN — SODIUM CHLORIDE, POTASSIUM CHLORIDE, SODIUM LACTATE AND CALCIUM CHLORIDE: 600; 310; 30; 20 INJECTION, SOLUTION INTRAVENOUS at 23:54

## 2023-02-03 RX ADMIN — HYDRALAZINE HYDROCHLORIDE 25 MG: 25 TABLET, FILM COATED ORAL at 20:28

## 2023-02-03 RX ADMIN — HYDRALAZINE HYDROCHLORIDE 25 MG: 25 TABLET, FILM COATED ORAL at 08:41

## 2023-02-03 RX ADMIN — HYDRALAZINE HYDROCHLORIDE 25 MG: 25 TABLET, FILM COATED ORAL at 13:19

## 2023-02-03 RX ADMIN — Medication 10 ML: at 20:28

## 2023-02-03 RX ADMIN — ATORVASTATIN CALCIUM 80 MG: 80 TABLET, FILM COATED ORAL at 20:28

## 2023-02-03 RX ADMIN — ASPIRIN 81 MG: 81 TABLET, COATED ORAL at 08:41

## 2023-02-03 RX ADMIN — SODIUM CHLORIDE, POTASSIUM CHLORIDE, SODIUM LACTATE AND CALCIUM CHLORIDE: 600; 310; 30; 20 INJECTION, SOLUTION INTRAVENOUS at 08:15

## 2023-02-03 RX ADMIN — Medication 10 ML: at 08:42

## 2023-02-03 RX ADMIN — METOPROLOL SUCCINATE 100 MG: 50 TABLET, EXTENDED RELEASE ORAL at 08:41

## 2023-02-03 RX ADMIN — PIPERACILLIN AND TAZOBACTAM 3375 MG: 3; .375 INJECTION, POWDER, FOR SOLUTION INTRAVENOUS at 20:29

## 2023-02-03 RX ADMIN — PIPERACILLIN AND TAZOBACTAM 3375 MG: 3; .375 INJECTION, POWDER, FOR SOLUTION INTRAVENOUS at 09:59

## 2023-02-03 ASSESSMENT — PAIN DESCRIPTION - PAIN TYPE: TYPE: SURGICAL PAIN

## 2023-02-03 ASSESSMENT — PAIN DESCRIPTION - DESCRIPTORS: DESCRIPTORS: STABBING

## 2023-02-03 ASSESSMENT — PAIN SCALES - GENERAL
PAINLEVEL_OUTOF10: 0
PAINLEVEL_OUTOF10: 3
PAINLEVEL_OUTOF10: 0
PAINLEVEL_OUTOF10: 0
PAINLEVEL_OUTOF10: 3
PAINLEVEL_OUTOF10: 0

## 2023-02-03 ASSESSMENT — PAIN DESCRIPTION - LOCATION: LOCATION: ABDOMEN

## 2023-02-03 ASSESSMENT — PAIN DESCRIPTION - ORIENTATION: ORIENTATION: MID;UPPER

## 2023-02-03 NOTE — PROGRESS NOTES
[facility]        Progress Note    Patient Name: Edwin David  MRN: [id number]  YOB: 1950  Date of Evaluation: 2/3/2023    Subjective:  No acute events overnight  Pain controlled  No nausea or vomiting, tolerating regular diet  Passing flatus, no BM  Up to chair at this time    Post-Operative Day #1      Vital Signs:  Patient Vitals for the past 24 hrs:   BP Temp Temp src Pulse Resp SpO2   23 1200 (!) 140/53 97.9 °F (36.6 °C) Oral 51 17 99 %   23 0824 (!) 139/55 98.1 °F (36.7 °C) Oral 53 18 97 %   23 0433 138/62 97.6 °F (36.4 °C) Oral 59 19 96 %   23 0030 (!) 139/49 98.4 °F (36.9 °C) Oral 57 19 --   23 2315 (!) 139/49 98.4 °F (36.9 °C) Oral 57 19 --   23 2048 (!) 145/54 97.5 °F (36.4 °C) Oral 60 18 95 %   23 1945 -- -- -- 65 18 99 %   23 1555 (!) 150/67 97.4 °F (36.3 °C) Oral 65 17 94 %      TEMPERATURE HISTORY 24H: Temp (24hrs), Av.9 °F (36.6 °C), Min:97.4 °F (36.3 °C), Max:98.4 °F (36.9 °C)    BLOOD PRESSURE HISTORY: Systolic (41KSK), CXC:734 , Min:99 , LSD:166    Diastolic (13ZLT), ZHF:84, Min:42, Max:79      Intake/Output:  I/O last 3 completed shifts:   In: 600 [I.V.:600]  Out: 25 [Blood:25]  I/O this shift:  In: 300 [P.O.:300]  Out: -   Drain/tube Output:       Physical Exam:  General: awake, alert, oriented to  person, place, time  Lungs: unlabored respirations  Abdomen: soft, non-distended, incisional tenderness only, bowel sounds present   Skin/Wound: healing well, no drainage, no erythema, well approximated    Labs:  CBC:    Recent Labs     23  0845 23  0550 23  0534   WBC 13.2* 9.3 13.7*   HGB 10.8* 10.2* 9.8*   HCT 33.9* 31.8* 30.3*    172 192     BMP:    Recent Labs     23  0845 23  0550 23  0534    144 139   K 4.1 3.8 4.0    110 107   CO2 26 21 24   BUN 26* 18 38*   CREATININE 1.1 1.0 1.8*   GLUCOSE 113* 83 152*     Hepatic:    Recent Labs 02/01/23  0845 02/02/23  0550 02/03/23  0534   AST 17 15 46*   ALT 14 11 41*   BILITOT <0.2 0.6 0.3   ALKPHOS 111 95 98     Amylase:  No results found for: AMYLASE  Lipase:    Lab Results   Component Value Date/Time    LIPASE 230.0 02/03/2023 05:34 AM    LIPASE 21.0 02/02/2023 05:50 AM    LIPASE 228.0 02/01/2023 08:45 AM      Mag:  No results found for: MG  Phos:   No results found for: PHOS   Coags: No results found for: PROTIME, INR, APTT    Cultures:  Anaerobic culture  No results found for: LABANAE  Fungus stain  No results found for requested labs within last 30 days. Gram stain  No results found for requested labs within last 30 days. Organism  No results found for: [facility]  Surgical culture  No results found for: CXSURG  Blood culture 1  No results found for requested labs within last 30 days. Blood culture 2  No results found for requested labs within last 30 days. Fecal occult  No results found for requested labs within last 30 days. GI bacterial pathogens by PCR  No results found for requested labs within last 30 days. C. difficile  No results found for requested labs within last 30 days. Urine culture  No results found for: LABURIN    Pathology:  OR 2/2/2023--FINAL DIAGNOSIS:   Gallbladder, cholecystectomy:   - Acute cholecystitis, marked. - Cholelithiasis. Imaging:  I have personally reviewed the following films:    FL CHOLANGIOGRAM OR    Result Date: 2/2/2023  EXAMINATION: SPOT IMAGES FROM AN INTRAOPERATIVE CHOLANGIOGRAM 2/2/2023 8:18 am COMPARISON: None. HISTORY: ORDERING SYSTEM PROVIDED HISTORY: cholangiogram TECHNOLOGIST PROVIDED HISTORY: Reason for exam:->cholangiogram Reason for Exam: la maurilio in OR FLUOROSCOPY DOSE AND TYPE OR TIME AND EXPOSURES: 9 seconds fluoroscopy, 6 fluoroscopic images FINDINGS: Contrast is seen within the cystic duct, common duct, central hepatic ducts, and within small bowel. There is tapering of the distal common bile duct.      Provision of fluoroscopy for procedural guidance.  Please refer to procedure note for further details.      Scheduled Meds:   aspirin  81 mg Oral Daily    atorvastatin  80 mg Oral Nightly    hydrALAZINE  25 mg Oral 3 times per day    [Held by provider] losartan  100 mg Oral Daily    metoprolol succinate  100 mg Oral Daily    piperacillin-tazobactam  3,375 mg IntraVENous Q8H    sodium chloride flush  5-40 mL IntraVENous 2 times per day     Continuous Infusions:   lactated ringers IV soln 100 mL/hr at 02/03/23 0815    sodium chloride 100 mL/hr at 02/03/23 0954     PRN Meds:.HYDROcodone 5 mg - acetaminophen **OR** HYDROcodone 5 mg - acetaminophen, morphine, sodium chloride flush, sodium chloride, ondansetron **OR** [DISCONTINUED] ondansetron, polyethylene glycol, acetaminophen **OR** acetaminophen      Assessment:  72 y.o. female admitted with   1. Acute cholecystitis        OR Date 2/2/2023, laparoscopic cholecystectomy with intraoperative for acute calculous cholecystitis  Elevated serum lipase  Acute kidney injury      Plan:  1. Pain controlled, no nausea/vomiting--tolerating regular diet, passing flatus, incisions healing well, vitals stable but elevated creatinine and lipase; continued supportive care, repeat labs tomorrow morning  2. Regular diet as tolerated; monitor bowel function  3. IV hydration; monitor and correct electrolytes  4. Antibiotics  5. Activity as tolerated, ambulate TID, up to chair for all meals  6. Pulmonary toilet, incentive spirometry  7. PRN analgesics and antiemetics--minimizing narcotics as tolerated, transition to PO  8. DVT prophylaxis with SCD's  9. Management of medical comorbid etiologies per primary team and consulting services  10. Disposition: Discharge home when stable medically; hopefully in the next 24-48 hours    EDUCATION:  Educated patient on plan of care and disease process--all questions answered.    Plans discussed with patient and nursing.  Reviewed and discussed with   Patrizia Stains.       Signed:  SERENA Babcock - CNP  2/3/2023 3:19 PM    Postop day #1 status post laparoscopic cholecystectomy  Doing well  Had increase in creatinine from 1.0-1.8  Lipase mildly elevated at 230  Diet as tolerated  Not Ready for discharge home yet

## 2023-02-03 NOTE — PROGRESS NOTES
[facility]   Phone: [phone]    Physical Therapy    [] Initial Evaluation            [x] Daily Treatment Note         [x] Discharge Summary      Patient: Srinivasa Client   : 1950   MRN: [id number]   Date of Service:  2/3/2023  Admitting Diagnosis: Acute cholecystitis  Current Admission Summary: Per H&P on  \"77 y.o. female   With feeling uncomfortable dull discomfort of radiating to her back and nausea but no vomiting no diarrhea no fever no chills no shortness of breath. In the ED patient had imaging that was suggestive of cholecystitis. \"   - lap cholecystectomy  Past Medical History:  has a past medical history of Allergic rhinitis, Asthma, Colorectal polyps, Diabetes ([address] Hyperlipidemia, Menopause, Obesity, and Thyroid nodule. Past Surgical History:  has a past surgical history that includes Hysterectomy; Tubal ligation (28); Colonoscopy; and Cholecystectomy, laparoscopic (N/A, 2023). Discharge Recommendations: Srinivasa Client scored a 24/24 on the AM-PAC short mobility form. At this time, no further PT is recommended upon discharge due to pt functioning near her baseline. Recommend patient returns to prior setting with prior services. DME Required For Discharge: no DME required at discharge    Goes by Leena  Precautions/Restrictions: medium fall risk  Weight Bearing Restrictions: no restrictions  [] Right Upper Extremity  [] Left Upper Extremity [] Right Lower Extremity  [] Left Lower Extremity     Required Braces/Orthotics: no braces required   [] Right  [] Left  Positional Restrictions:no positional restrictions    Pre-Admission Information   Lives With: spouse - uses SPC, cannot provide increased assistance  Son and DIL live down the street, able to assist as needed  Type of Home: house  Home Layout: one level, able to live on main level, with basement  Home Access:  3 step to enter with handrail.   Handrails are located on both side.  Bathroom Layout:  master bath is walk-in, but pt uses tub/shower   Bathroom Equipment: grab bars in shower, hand held shower head  Toilet Height: standard height  Home Equipment: reacher  Transfer Assistance: Independent without use of device  Ambulation Assistance: Independent without use of device  ADL Assistance: independent with all ADL's  IADL Assistance: independent with homemaking tasks   She is her 's caregiver - assist with socks/shoes, manages doctor's appointment  Active :        [x] Yes  [] No  Hand Dominance: [x] Left  [] Right  Current Employment: retired. Occupation:   [employer]: [address] [place] [address] Avenue: No falls in last 6 months      Subjective  General: Patient standing adjusting hospital gown upon arrival into room with IV  in [facility] [address] [facility]. IV occluded, RN notified. Pain: 7/10. Location: (R) abdomen, right under rib cage  Pain Interventions: pain medication in place prior to arrival, RN notified, and therapy activities modified       Functional Mobility  Bed Mobility  Bed mobility not completed on this date. Comments:   Transfers  Sit to stand transfer: Independent  Stand to sit transfer: Independent  Comments: Pt utilized arm rests to push up into stand and control eccentric descent. Pt performed sit to stand and stand to sit transfers from recliner throughout the session. Ambulation  Surface:level surface  Assistive Device: no device  Assistance: Independent  Distance: 300 ft  Gait Mechanics: Wide ANTHONY, slow monique, increased medial to lateral trunk sway  Comments:  Pt ambulated in hallway with good safety awareness. Pt reported fatigue, had increased SOB, and requested a standing rest break for ~30 seconds - 1 min after ~150 ft ambulation. Pt ambulated at slightly slower gait speed during the last ~150 ft due to fatigue. Stair Mobility  Stair mobility not completed on this date.   Comments: Pt refusing stair attempt this date however pt states she does not have concerns with stairs at this time. Wheelchair Mobility:  No w/c mobility completed on this date. Comments:  Balance  Static Sitting Balance: good: independent with functional balance in unsupported position  Dynamic Sitting Balance: good: independent with functional balance in unsupported position  Static Standing Balance: good: independent with functional balance in unsupported position  Dynamic Standing Balance: good: independent with functional balance in unsupported position  Comments:     Other Therapeutic Interventions  Pt education about allowing son and daughter in law to assist with grocery shopping and iADLs as needed when return to home. Education about utilizing grocery pick-up/delivery services, but pt prefers to choose her own. Pt education about discharging from therapy, and pt states she has no concerns about her functional mobility with returning to home. Functional Outcomes  AM-PAC Inpatient Mobility Raw Score : 24              Cognition  WFL  Orientation:    alert and oriented x 4  Command Following:   [facility] [address]    Education  Barriers To Learning: none  Patient Education: patient educated on goals, plan of care, general safety, functional mobility training, disease specific education, discharge recommendations  Learning Assessment:  patient verbalizes and demonstrates understanding    Assessment  Activity Tolerance: No adverse symptoms or reactions to activity. Impairments Requiring Therapeutic Intervention: decreased functional mobility, decreased endurance, decreased balance, increased pain  Prognosis: good  Clinical Assessment: Patient is a 72 yo female admitted to [facility] for abdominal pain seen now s/p ramirez thorpe on 2/2/23. Pt progressing to independent with all functional mobility, and no longer requires skilled therapy. Pt expresses no concerns regarding her functional mobility or discharge home.  Pt demonstrates safe judgement and insights, and is appropriate to discharge home with no follow-up therapy. Safety Interventions: patient left in chair, call light within reach, gait belt, and nurse notified    Plan  Frequency: Discharge. No follow up required. Current Treatment Recommendations: Discharge. Goals  Patient Goals: Go home tomorrow  Short Term Goals:  Time Frame: Before discharge  Patient will complete bed mobility at [address] 2/3/2023  Patient will complete transfers at [address] - MET 2/3/2023  Patient will ambulate 400 ft with use of no device at [address] [facility], Pt ambulated 300 ft with no device at independent 2/3/2023  Patient will ascend/descend 3 stairs with (B) handrail at modified independent - NOT MET   Patient to maintain standing at Independent for 15 minutes without UE support in order to complete IADLs. - PROGRESSING 2/3/2023  *Due to pt  being independent with all functional mobility at this date, pt will be discharged from PT services. Pt agreeable, and reports no concerns with functional mobility and discharge home. Therapy Session Time      Individual Group Co-treatment   Time In 0920       Time Out 0945       Minutes 25         Timed Code Treatment Minutes:   25  Total Treatment Minutes:  25     Pau Smith, SPT    PT providing direct supervision during session and assisting in making skilled judgements throughout session.   [address], [place] [id number]    Electronically Signed By: Nam Jean-Baptiste, PT

## 2023-02-03 NOTE — PROGRESS NOTES
[facility]    2/3/2023 9:25 AM        Name: Jef Fowler . Admitted: 2/1/2023  Primary Care Provider: Joseph Espana MD (Tel: [phone])        Subjective:    Bed abdominal pain improved underwent lap maurilio yesterday white count up and TAMMIE today  Reviewed interval ancillary notes    Current Medications  lactated ringers IV soln infusion, Continuous  HYDROcodone-acetaminophen (NORCO) 5-325 MG per tablet 1 tablet, Q4H PRN   Or  HYDROcodone-acetaminophen (NORCO) 5-325 MG per tablet 2 tablet, Q4H PRN  aspirin EC tablet 81 mg, Daily  atorvastatin (LIPITOR) tablet 80 mg, Nightly  hydrALAZINE (APRESOLINE) tablet 25 mg, 3 times per day  [Held by provider] losartan (COZAAR) tablet 100 mg, Daily  metoprolol succinate (TOPROL XL) extended release tablet 100 mg, Daily  piperacillin-tazobactam (ZOSYN) 3,375 mg in sodium chloride 0.9 % 50 mL IVPB (mini-bag), Q8H  morphine (PF) injection 2 mg, Q4H PRN  sodium chloride flush 0.9 % injection 5-40 mL, 2 times per day  sodium chloride flush 0.9 % injection 5-40 mL, PRN  0.9 % sodium chloride infusion, PRN  ondansetron (ZOFRAN-ODT) disintegrating tablet 4 mg, Q8H PRN  polyethylene glycol (GLYCOLAX) packet 17 g, Daily PRN  acetaminophen (TYLENOL) tablet 650 mg, Q6H PRN   Or  acetaminophen (TYLENOL) suppository 650 mg, Q6H PRN      Objective:  BP (!) 139/55   Pulse 53   Temp 98.1 °F (36.7 °C) (Oral)   Resp 18   Ht 5' 4\" (1.626 m)   Wt 195 lb (88.5 kg)   SpO2 97%   BMI 33.47 kg/m²     Intake/Output Summary (Last 24 hours) at 2/3/2023 0925  Last data filed at 2/3/2023 0824  Gross per 24 hour   Intake 400 ml   Output --   Net 400 ml        Wt Readings from Last 3 Encounters:   02/01/23 195 lb (88.5 kg)   08/12/10 188 lb 3.2 oz (85.4 kg)       General appearance:  Appears comfortable.  AAOx3  HEENT: atraumatic, Pupils equal, muscous membranes moist, no masses appreciated  Cardiovascular: Regular rate and rhythm no murmurs appreciated  Respiratory: CTAB no wheezing  Gastrointestinal: tenderness improved bs+  EXT: no edema  Neurology: no gross focal deficts  Psychiatry: Appropriate affect. Not agitated  Skin: Warm, dry, no rashes appreciated    Labs and Tests:  CBC:   Recent Labs     02/01/23  0845 02/02/23  0550 02/03/23  0534   WBC 13.2* 9.3 13.7*   HGB 10.8* 10.2* 9.8*    172 192       BMP:    Recent Labs     02/01/23  0845 02/02/23  0550 02/03/23  0534    144 139   K 4.1 3.8 4.0    110 107   CO2 26 21 24   BUN 26* 18 38*   CREATININE 1.1 1.0 1.8*   GLUCOSE 113* 83 152*       Hepatic:   Recent Labs     02/01/23  0845 02/02/23  0550 02/03/23  0534   AST 17 15 46*   ALT 14 11 41*   BILITOT <0.2 0.6 0.3   ALKPHOS 111 95 98       FL CHOLANGIOGRAM OR   Final Result   Provision of fluoroscopy for procedural guidance. Please refer to procedure   note for further details. US GALLBLADDER RUQ   Final Result   Cholelithiasis with mild gallbladder wall thickening and positive sonographic   Padilla sign. Findings are suggestive of acute cholecystitis. Recommend HIDA   scan for confirmation. CT CHEST ABDOMEN PELVIS W CONTRAST Additional Contrast? None   Final Result   1. Cardiomegaly with mild interstitial edema. 2. Solid subcentimeter bilateral pulmonary nodules. 3. Mediastinal and left axillary adenopathy could be reactive. 4. Mild pericholecystic inflammatory stranding can be seen in the setting of   cholecystitis. RECOMMENDATION:   Fleischner Society guidelines for follow-up and management of incidentally   detected pulmonary nodules:      Multiple Solid Nodules:      Nodule size less than 6 mm   In a low-risk patient, no routine follow-up. In a high-risk patient, optional CT at 12 months. XR CHEST PORTABLE   Final Result   1. Cardiomegaly.              Recent imaging reviewed    Problem List  Principal Problem:    Acute cholecystitis  Resolved Problems:    * No resolved hospital problems.  *       Assessment/Plan:   Acute cholecystitis s/p lap maurilio  - zosyn  -wbc up to 13.7 repeat labs in am  - iv morphine    Joo: ivf, stop arb and repeat labs in am       Htn urgency: improved        Elevated lipase 230 repeat labs in am     Tobacco abuse: counseled on need to to quit,         DVT prophylaxis lovenox  Code status full code         Juan Hodges MD   2/3/2023 9:25 AM

## 2023-02-03 NOTE — PROGRESS NOTES
[facility]    2/3/2023 9:24 AM        Name: Mary Abreu . Admitted: 2/1/2023  Primary Care Provider: Christel Meza MD (Tel: [phone])        Subjective:    Lying inbed some abodminal discomfort no chest pain fever or hcills    Reviewed interval ancillary notes    Current Medications  lactated ringers IV soln infusion, Continuous  HYDROcodone-acetaminophen (NORCO) 5-325 MG per tablet 1 tablet, Q4H PRN   Or  HYDROcodone-acetaminophen (NORCO) 5-325 MG per tablet 2 tablet, Q4H PRN  aspirin EC tablet 81 mg, Daily  atorvastatin (LIPITOR) tablet 80 mg, Nightly  hydrALAZINE (APRESOLINE) tablet 25 mg, 3 times per day  [Held by provider] losartan (COZAAR) tablet 100 mg, Daily  metoprolol succinate (TOPROL XL) extended release tablet 100 mg, Daily  piperacillin-tazobactam (ZOSYN) 3,375 mg in sodium chloride 0.9 % 50 mL IVPB (mini-bag), Q8H  morphine (PF) injection 2 mg, Q4H PRN  sodium chloride flush 0.9 % injection 5-40 mL, 2 times per day  sodium chloride flush 0.9 % injection 5-40 mL, PRN  0.9 % sodium chloride infusion, PRN  ondansetron (ZOFRAN-ODT) disintegrating tablet 4 mg, Q8H PRN  polyethylene glycol (GLYCOLAX) packet 17 g, Daily PRN  acetaminophen (TYLENOL) tablet 650 mg, Q6H PRN   Or  acetaminophen (TYLENOL) suppository 650 mg, Q6H PRN        Objective:  BP (!) 139/55   Pulse 53   Temp 98.1 °F (36.7 °C) (Oral)   Resp 18   Ht 5' 4\" (1.626 m)   Wt 195 lb (88.5 kg)   SpO2 97%   BMI 33.47 kg/m²     Intake/Output Summary (Last 24 hours) at 2/3/2023 0924  Last data filed at 2/3/2023 0824  Gross per 24 hour   Intake 400 ml   Output --   Net 400 ml      Wt Readings from Last 3 Encounters:   02/01/23 195 lb (88.5 kg)   08/12/10 188 lb 3.2 oz (85.4 kg)       General appearance:  Appears comfortable.  AAOx3  HEENT: atraumatic, Pupils equal, muscous membranes moist, no masses appreciated  Cardiovascular: Regular rate and rhythm no murmurs appreciated  Respiratory: CTAB no wheezing  Gastrointestinal: RUQ tenderness to palpation no guarding  EXT: no edema  Neurology: no gross focal deficts  Psychiatry: Appropriate affect. Not agitated  Skin: Warm, dry, no rashes appreciated    Labs and Tests:  CBC:   Recent Labs     02/01/23  0845 02/02/23  0550 02/03/23  0534   WBC 13.2* 9.3 13.7*   HGB 10.8* 10.2* 9.8*    172 192     BMP:    Recent Labs     02/01/23  0845 02/02/23  0550 02/03/23  0534    144 139   K 4.1 3.8 4.0    110 107   CO2 26 21 24   BUN 26* 18 38*   CREATININE 1.1 1.0 1.8*   GLUCOSE 113* 83 152*     Hepatic:   Recent Labs     02/01/23  0845 02/02/23  0550 02/03/23  0534   AST 17 15 46*   ALT 14 11 41*   BILITOT <0.2 0.6 0.3   ALKPHOS 111 95 98     FL CHOLANGIOGRAM OR   Final Result   Provision of fluoroscopy for procedural guidance. Please refer to procedure   note for further details. US GALLBLADDER RUQ   Final Result   Cholelithiasis with mild gallbladder wall thickening and positive sonographic   Padilla sign. Findings are suggestive of acute cholecystitis. Recommend HIDA   scan for confirmation. CT CHEST ABDOMEN PELVIS W CONTRAST Additional Contrast? None   Final Result   1. Cardiomegaly with mild interstitial edema. 2. Solid subcentimeter bilateral pulmonary nodules. 3. Mediastinal and left axillary adenopathy could be reactive. 4. Mild pericholecystic inflammatory stranding can be seen in the setting of   cholecystitis. RECOMMENDATION:   Fleischner Society guidelines for follow-up and management of incidentally   detected pulmonary nodules:      Multiple Solid Nodules:      Nodule size less than 6 mm   In a low-risk patient, no routine follow-up. In a high-risk patient, optional CT at 12 months. XR CHEST PORTABLE   Final Result   1. Cardiomegaly.              Recent imaging reviewed    Problem List  Principal Problem: Acute cholecystitis  Resolved Problems:    * No resolved hospital problems.  *       Assessment/Plan:   Acute cholecystitis  - zosyn  -plan for lap maurilio  - iv morphine       Htn urgency: home meds and titrate up as needed        Elevated lipase trend     Tobacco abuse: counseled on need to to quit,         DVT prophylaxis lovenox  Code status full code         Mihai Agudelo MD   2/3/2023 9:24 AM

## 2023-02-03 NOTE — OP NOTE
[facility] [address]                     [address] [facility], [address] [facility]                                OPERATIVE REPORT    PATIENT NAME: Karen Mcneill                      :        1950  MED REC NO:   [id number]                          ROOM:       9427  ACCOUNT NO:   [de-identified]                           ADMIT DATE: 2023  PROVIDER:     Risa Ferrer MD    DATE OF PROCEDURE:  2023    PREOPERATIVE DIAGNOSIS:  Acute calculous cholecystitis. POSTOPERATIVE DIAGNOSIS:  Acute calculous cholecystitis. PROCEDURE:  Laparoscopic cholecystectomy with intraoperative  cholangiogram.    SURGEON:  Risa Ferrer MD    ANESTHESIA:  General endotracheal and local.    ESTIMATED BLOOD LOSS:  Minimal.    COMPLICATIONS:  None. SPECIMENS:  Gallbladder. OPERATIVE INDICATIONS AND CONSENT:  The patient is a 25-year-old female  with upper abdominal pain. Right upper quadrant ultrasound showed  cholelithiasis. She is brought to the operating room today for  laparoscopic cholecystectomy. She was explained the risks, benefits and  possible complications including risk of bleeding, bowel injury, bile  duct injury or vascular injury to the liver. DETAILS OF THE PROCEDURE:  The patient was brought to the operative  suite and placed in the supine position on the operating table. After  general endotracheal anesthesia, she was prepped and draped in the usual  sterile fashion. We had a 5-mm transverse incision above the umbilicus. A Veress needle  was passed through the peritoneal cavity and after adequate  insufflation, a 5-mm Optiview trocar was placed at this site. An 11-mm  subxiphoid trocar was placed followed by two 5-mm trocars right upper  quadrant. The gallbladder was thickened and distended. It was drained  with an ovarian needle via the fundus of the gallbladder. The  gallbladder then grasped via lateral trocar sites.   Dissection was  undertaken in the hilar region. The cystic duct and cystic artery was  identified and dissected free circumferentially. The cystic artery was  doubly clipped proximally, singly clipped distally, but not divided. A  clip was placed at the gallbladder cystic duct junction. A small  transverse opening was then made in the duct. A cholangiogram catheter  was through a separate stab incision in the right upper quadrant, placed  in the opening of the duct. Intraoperative cholangiography revealed  normal ductal anatomy with no filling defects and easy flow into the  duodenum. The catheter was removed. The cystic duct was then doubly  clipped distally before it was completely transected. The cystic artery  was divided between the previously placed clips. The gallbladder was  taken off of liver bed, placed in EndoCatch bag and then removed through  the subxiphoid trocar site. Trocar was then reinserted. We did all  pressure on the liver bed. There are few areas which were cauterized. Because of raw surface area, we did place a layer of Fibrillar Surgicel  on the gallbladder bed. This provided excellent hemostasis. The  trocars were removed under direct visualization and the abdomen was  de-insufflated. All the trocar sites were injected with 0.5% Marcaine  with epinephrine before the skin of the incisions were closed with  running 4-0 subcuticular sutures. Dermabond was then applied. The  patient tolerated the procedure without difficulty and was transferred  to recovery room in stable condition. Tano Weller.  Annette Darden MD    D: 02/02/2023 8:41:50       T: 02/02/2023 8:45:44     [id number]  Job#: [id number]     Doc#: [id number]    CC:  Navi Bobo MD

## 2023-02-03 NOTE — PROGRESS NOTES
Shift assessment completed. Routine vitals completed. Scheduled medications given. Patient is awake, alert and oriented. Respirations are  unlabored. Patient does not appear to be in distress, resting in chair at this time. Call light within reach. Antibiotics administered.

## 2023-02-04 VITALS
HEART RATE: 50 BPM | OXYGEN SATURATION: 96 % | HEIGHT: 64 IN | RESPIRATION RATE: 18 BRPM | BODY MASS INDEX: 33.29 KG/M2 | WEIGHT: 195 LBS | DIASTOLIC BLOOD PRESSURE: 45 MMHG | TEMPERATURE: 98 F | SYSTOLIC BLOOD PRESSURE: 126 MMHG

## 2023-02-04 LAB
A/G RATIO: 1.1 (ref 1.1–2.2)
ALBUMIN SERPL-MCNC: 3.1 G/DL (ref 3.4–5)
ALP BLD-CCNC: 89 U/L (ref 40–129)
ALT SERPL-CCNC: 37 U/L (ref 10–40)
ANION GAP SERPL CALCULATED.3IONS-SCNC: 7 MMOL/L (ref 3–16)
AST SERPL-CCNC: 32 U/L (ref 15–37)
BASOPHILS ABSOLUTE: 0.1 K/UL (ref 0–0.2)
BASOPHILS RELATIVE PERCENT: 0.9 %
BILIRUB SERPL-MCNC: 0.3 MG/DL (ref 0–1)
BUN BLDV-MCNC: 34 MG/DL (ref 7–20)
CALCIUM SERPL-MCNC: 8.3 MG/DL (ref 8.3–10.6)
CHLORIDE BLD-SCNC: 112 MMOL/L (ref 99–110)
CO2: 21 MMOL/L (ref 21–32)
CREAT SERPL-MCNC: 1.2 MG/DL (ref 0.6–1.2)
EOSINOPHILS ABSOLUTE: 0.1 K/UL (ref 0–0.6)
EOSINOPHILS RELATIVE PERCENT: 1.2 %
GFR SERPL CREATININE-BSD FRML MDRD: 48 ML/MIN/{1.73_M2}
GLUCOSE BLD-MCNC: 103 MG/DL (ref 70–99)
HCT VFR BLD CALC: 28.9 % (ref 36–48)
HEMOGLOBIN: 9.6 G/DL (ref 12–16)
LIPASE: 69 U/L (ref 13–60)
LYMPHOCYTES ABSOLUTE: 3.7 K/UL (ref 1–5.1)
LYMPHOCYTES RELATIVE PERCENT: 33.5 %
MCH RBC QN AUTO: 28.6 PG (ref 26–34)
MCHC RBC AUTO-ENTMCNC: 33.1 G/DL (ref 31–36)
MCV RBC AUTO: 86.4 FL (ref 80–100)
MONOCYTES ABSOLUTE: 0.9 K/UL (ref 0–1.3)
MONOCYTES RELATIVE PERCENT: 8.1 %
NEUTROPHILS ABSOLUTE: 6.2 K/UL (ref 1.7–7.7)
NEUTROPHILS RELATIVE PERCENT: 56.3 %
PDW BLD-RTO: 16.6 % (ref 12.4–15.4)
PLATELET # BLD: 162 K/UL (ref 135–450)
PMV BLD AUTO: 10.6 FL (ref 5–10.5)
POTASSIUM REFLEX MAGNESIUM: 3.9 MMOL/L (ref 3.5–5.1)
RBC # BLD: 3.34 M/UL (ref 4–5.2)
SODIUM BLD-SCNC: 140 MMOL/L (ref 136–145)
TOTAL PROTEIN: 6 G/DL (ref 6.4–8.2)
WBC # BLD: 11.1 K/UL (ref 4–11)

## 2023-02-04 PROCEDURE — 99024 POSTOP FOLLOW-UP VISIT: CPT | Performed by: SURGERY

## 2023-02-04 PROCEDURE — 85025 COMPLETE CBC W/AUTO DIFF WBC: CPT

## 2023-02-04 PROCEDURE — 2580000003 HC RX 258: Performed by: SURGERY

## 2023-02-04 PROCEDURE — 6370000000 HC RX 637 (ALT 250 FOR IP): Performed by: SURGERY

## 2023-02-04 PROCEDURE — 83690 ASSAY OF LIPASE: CPT

## 2023-02-04 PROCEDURE — 6360000002 HC RX W HCPCS: Performed by: SURGERY

## 2023-02-04 PROCEDURE — 80053 COMPREHEN METABOLIC PANEL: CPT

## 2023-02-04 PROCEDURE — 36415 COLL VENOUS BLD VENIPUNCTURE: CPT

## 2023-02-04 RX ADMIN — SODIUM CHLORIDE: 9 INJECTION, SOLUTION INTRAVENOUS at 09:40

## 2023-02-04 RX ADMIN — HYDRALAZINE HYDROCHLORIDE 25 MG: 25 TABLET, FILM COATED ORAL at 05:38

## 2023-02-04 RX ADMIN — ASPIRIN 81 MG: 81 TABLET, COATED ORAL at 09:33

## 2023-02-04 RX ADMIN — PIPERACILLIN AND TAZOBACTAM 3375 MG: 3; .375 INJECTION, POWDER, FOR SOLUTION INTRAVENOUS at 02:09

## 2023-02-04 RX ADMIN — PIPERACILLIN AND TAZOBACTAM 3375 MG: 3; .375 INJECTION, POWDER, FOR SOLUTION INTRAVENOUS at 09:41

## 2023-02-04 RX ADMIN — Medication 10 ML: at 09:34

## 2023-02-04 RX ADMIN — METOPROLOL SUCCINATE 100 MG: 50 TABLET, EXTENDED RELEASE ORAL at 09:33

## 2023-02-04 ASSESSMENT — PAIN SCALES - GENERAL: PAINLEVEL_OUTOF10: 0

## 2023-02-04 NOTE — DISCHARGE INSTRUCTIONS
Postoperative Instructions      Contact information     Office number - [phone]  Office hours are 8 am - 5 pm  Monday - Friday   Contact the doctor on call during the evenings ( 5 pm - 8 am ) or on Weekends for urgent or emergent issues by using the main office number ( [phone] ). Please hold routine questions until normal business hours. Wound care     The incisions are closed with dissolvable sutures which are beneath the skin. Surgical glue is then applied to the skin. The glue is purple in color and should be left undisturbed until your follow-up appointment. No bandages are required over the surgical glue. It is okay to shower but do not bathe in a bathtub. Gently wash over the incisions with soap and water and then pat them dry with a towel. Contact the office for redness of the skin surrounding the incision or if there is drainage of pus. Activities    It is generally okay to go up and down stairs. Please be careful as your gate may be unsteady from the surgery or pain medications. Driving: Do not drive while on narcotic pain medications or while still under the effect of narcotic pain medications. Make sure that you are moving comfortably and not limited by postoperative pain or weakness that would make it difficult to react in an emergency situation. Exercise : The main purpose of the activity restrictions is to reduce the risk of developing a hernia at an incision site. Do not lift greater than 25 lbs                   Avoid strenuous abdominal exercises- sit ups, core workouts                   Light cardiovascular exercise is generally okay                   Ask your doctor about the length of the exercise restrictions    Follow up     Please call the office for any concerns between the time of surgery and your follow up appointment.   For your convenience, we ask that you schedule your follow up appointment about 2 weeks from the time of surgery at a time which works best with your schedule. Please ask about virtual follow up as this is a very convenient way to follow up from some surgeries. Return to work is discussed with the doctor on an individual case basis. If you need documentation for return to work or FMLA paperwork, please contact the office at [phone].

## 2023-02-04 NOTE — PROGRESS NOTES
Tray Almodovar is a 71 y.o. female patient.     Current Facility-Administered Medications   Medication Dose Route Frequency Provider Last Rate Last Admin    lactated ringers IV soln infusion   IntraVENous Continuous Shree Meeks MD   Stopped at 02/04/23 1055    HYDROcodone-acetaminophen (NORCO) 5-325 MG per tablet 1 tablet  1 tablet Oral Q4H PRN Roberta Fuchs MD        Or    HYDROcodone-acetaminophen [facility]) 5-325 MG per tablet 2 tablet  2 tablet Oral Q4H PRN Roberta Fuchs MD   2 tablet at 02/02/23 1626    aspirin EC tablet 81 mg  81 mg Oral Daily Roberta Fuchs MD   81 mg at 02/04/23 0933    atorvastatin (LIPITOR) tablet 80 mg  80 mg Oral Nightly Roberta Fuchs MD   80 mg at 02/03/23 2028    hydrALAZINE (APRESOLINE) tablet 25 mg  25 mg Oral 3 times per day Roberta Fuchs MD   25 mg at 02/04/23 0538    [Held by provider] losartan (COZAAR) tablet 100 mg  100 mg Oral Daily Roberta Fuchs MD   100 mg at 02/02/23 1005    metoprolol succinate (TOPROL XL) extended release tablet 100 mg  100 mg Oral Daily Roberta Fuchs MD   100 mg at 02/04/23 0933    piperacillin-tazobactam (ZOSYN) 3,375 mg in sodium chloride 0.9 % 50 mL IVPB (mini-bag)  3,375 mg IntraVENous Q8H Roberta Fuchs MD 12.5 mL/hr at 02/04/23 0941 3,375 mg at 02/04/23 0941    morphine (PF) injection 2 mg  2 mg IntraVENous Q4H PRN Roberta Fuchs MD   2 mg at 02/02/23 1406    sodium chloride flush 0.9 % injection 5-40 mL  5-40 mL IntraVENous 2 times per day Roberta Fuchs MD   10 mL at 02/04/23 0934    sodium chloride flush 0.9 % injection 5-40 mL  5-40 mL IntraVENous PRN Roberta Fuchs MD        0.9 % sodium chloride infusion   IntraVENous PRN Roberta Fuchs MD   Stopped at 02/04/23 1053    ondansetron (ZOFRAN-ODT) disintegrating tablet 4 mg  4 mg Oral Q8H PRN Roberta Fuchs MD        polyethylene glycol [facility]) packet 17 g  17 g Oral Daily PRN Roberta Fuchs MD acetaminophen (TYLENOL) tablet 650 mg  650 mg Oral Q6H PRN Isamar Ware MD        Or    acetaminophen (TYLENOL) suppository 650 mg  650 mg Rectal Q6H PRN Isamar Ware MD         Allergies   Allergen Reactions    Cephalexin Anaphylaxis    Penicillins Swelling     Principal Problem:    Acute cholecystitis  Resolved Problems:    * No resolved hospital problems. *    Blood pressure (!) 126/45, pulse 50, temperature 98 °F (36.7 °C), temperature source Oral, resp. rate 18, height 5' 4\" (1.626 m), weight 195 lb (88.5 kg), SpO2 96 %. Subjective:  Symptoms:  Improved. Diet:  Adequate intake. Activity level: Normal.    Objective:  General Appearance:  Comfortable. Vital signs: (most recent): Blood pressure (!) 126/45, pulse 50, temperature 98 °F (36.7 °C), temperature source Oral, resp. rate 18, height 5' 4\" (1.626 m), weight 195 lb (88.5 kg), SpO2 96 %. Output: Producing urine. Lungs:  Normal effort and normal respiratory rate. Abdomen: Abdomen is soft and non-distended. (Incisions approximated without evidence of infection. Abdomen appropriately tender. ). Neurological: Patient is alert and oriented to person, place and time. Skin:  Warm and dry. Assessment & Plan postop day #2 status post laparoscopic cholecystectomy. Doing well. Okay for discharge home today from surgical perspective.     Isamar Ware MD  2/4/2023

## 2023-02-06 ENCOUNTER — TELEPHONE (OUTPATIENT)
Dept: SURGERY | Age: 73
End: 2023-02-06

## 2023-02-06 NOTE — TELEPHONE ENCOUNTER
Spoke to PT, she states she is retaining water and a 7-8 lb weight gain. I advised her to call PCP.  PT does not have a cardiologist.

## 2023-02-16 ENCOUNTER — OFFICE VISIT (OUTPATIENT)
Dept: SURGERY | Age: 73
End: 2023-02-16

## 2023-02-16 VITALS — BODY MASS INDEX: 33.3 KG/M2 | WEIGHT: 194 LBS | DIASTOLIC BLOOD PRESSURE: 38 MMHG | SYSTOLIC BLOOD PRESSURE: 138 MMHG

## 2023-02-16 DIAGNOSIS — K81.0 ACUTE CHOLECYSTITIS: Primary | ICD-10-CM

## 2023-02-16 PROCEDURE — 99024 POSTOP FOLLOW-UP VISIT: CPT | Performed by: SURGERY

## 2023-02-16 NOTE — LETTER
[facility] [address]  [address] [address]  Phone: [phone]  Fax: [phone]    February 16, 2023    Patient: Shea Canas  MRN:  [id number]  YOB: 1950  Date of Visit: 2/16/2023    Dear Dr Dipesh Stearns,    Thank you for the request for consultation for Nancy Morin. Below are the relevant portions of my assessment and plan of care. Assessment:  56-year-old female status post laparoscopic cholecystectomy. Pathology showed marked acute cholecystitis with cholelithiasis. Intraoperative cholangiogram showed no abnormalities or gallstones within the biliary ductal system. She is doing well postoperatively. Plan:  Follow-up as needed. If you have questions, please do not hesitate to call me. Sincerely,    Len Blanton MD    CC providers:    Stalin Wolff MD  [address] [place] [address].   [place] [address]  Via Fax: [phone]

## 2023-02-16 NOTE — PROGRESS NOTES
Subjective:      Patient ID: Shey Machuca is a 71 y.o. female. HPI    Review of Systems    Objective:   Physical Exam  Abdomen soft  Incisions healing well  Assessment:      78-year-old female status post laparoscopic cholecystectomy. Pathology showed marked acute cholecystitis with cholelithiasis. Intraoperative cholangiogram showed no abnormalities or gallstones within the biliary ductal system. She is doing well postoperatively. Plan:      Follow-up as needed.         Lina John MD

## 2023-10-14 NOTE — BRIEF OP NOTE
Brief Postoperative Note      Patient: Rosette Boudreaux  YOB: 1950  MRN: [id number]    Date of Procedure: 2/2/2023    Pre-Op Diagnosis: Acute calculus cholecystitis    Post-Op Diagnosis: Same       Procedure(s):  LAPAROSCOPIC CHOLECYSTECTOMY WITH INTRAOPERATIVE CHOLANGIOGRAM    Surgeon(s):  Haider Kevin MD    Assistant:  Surgical Assistant: Geovany Christian    Anesthesia: General    Estimated Blood Loss (mL): Minimal    Complications: None    Specimens:   ID Type Source Tests Collected by Time Destination   A : A) GALLBLADDER Tissue Gallbladder SURGICAL PATHOLOGY Haider Kevin MD 2/2/2023 0820        Implants:  * No implants in log *      Drains: * No LDAs found *    Findings: Acute calculus cholecystitis, normal cholangiogram    Electronically signed by Haider Kevin MD on 2/2/2023 at 8:25 AM
15-Oct-2023 00:30

## 2024-04-20 ENCOUNTER — HOSPITAL ENCOUNTER (EMERGENCY)
Age: 74
Discharge: HOME | End: 2024-04-20
Attending: EMERGENCY MEDICINE
Payer: MEDICARE

## 2024-04-20 ENCOUNTER — APPOINTMENT (OUTPATIENT)
Dept: GENERAL RADIOLOGY | Age: 74
End: 2024-04-20
Payer: MEDICARE

## 2024-04-20 ENCOUNTER — APPOINTMENT (OUTPATIENT)
Dept: CT IMAGING | Age: 74
End: 2024-04-20
Payer: MEDICARE

## 2024-04-20 VITALS
OXYGEN SATURATION: 100 % | DIASTOLIC BLOOD PRESSURE: 46 MMHG | HEART RATE: 60 BPM | SYSTOLIC BLOOD PRESSURE: 160 MMHG | RESPIRATION RATE: 17 BRPM | TEMPERATURE: 98 F

## 2024-04-20 DIAGNOSIS — R07.9 CHEST PAIN, UNSPECIFIED TYPE: Primary | ICD-10-CM

## 2024-04-20 LAB
ALBUMIN SERPL-MCNC: 3.6 G/DL (ref 3.4–5)
ALBUMIN/GLOB SERPL: 1.2 {RATIO} (ref 1.1–2.2)
ALP SERPL-CCNC: 114 U/L (ref 40–129)
ALT SERPL-CCNC: 13 U/L (ref 10–40)
ANION GAP SERPL CALCULATED.3IONS-SCNC: 10 MMOL/L (ref 3–16)
AST SERPL-CCNC: 21 U/L (ref 15–37)
BASOPHILS # BLD: 0.1 K/UL (ref 0–0.2)
BASOPHILS NFR BLD: 0.9 %
BILIRUB SERPL-MCNC: <0.2 MG/DL (ref 0–1)
BUN SERPL-MCNC: 22 MG/DL (ref 7–20)
CALCIUM SERPL-MCNC: 8.8 MG/DL (ref 8.3–10.6)
CHLORIDE SERPL-SCNC: 107 MMOL/L (ref 99–110)
CO2 SERPL-SCNC: 24 MMOL/L (ref 21–32)
CREAT SERPL-MCNC: 1.2 MG/DL (ref 0.6–1.2)
D DIMER: 1.62 UG/ML FEU (ref 0–0.6)
DEPRECATED RDW RBC AUTO: 16.2 % (ref 12.4–15.4)
EKG ATRIAL RATE: 61 BPM
EKG DIAGNOSIS: NORMAL
EKG P AXIS: 71 DEGREES
EKG P-R INTERVAL: 192 MS
EKG Q-T INTERVAL: 434 MS
EKG QRS DURATION: 84 MS
EKG QTC CALCULATION (BAZETT): 436 MS
EKG R AXIS: 49 DEGREES
EKG T AXIS: 52 DEGREES
EKG VENTRICULAR RATE: 61 BPM
EOSINOPHIL # BLD: 0.2 K/UL (ref 0–0.6)
EOSINOPHIL NFR BLD: 2 %
GFR SERPLBLD CREATININE-BSD FMLA CKD-EPI: 48 ML/MIN/{1.73_M2}
GLUCOSE SERPL-MCNC: 153 MG/DL (ref 70–99)
HCT VFR BLD AUTO: 33 % (ref 36–48)
HGB BLD-MCNC: 11.2 G/DL (ref 12–16)
LYMPHOCYTES # BLD: 2.7 K/UL (ref 1–5.1)
LYMPHOCYTES NFR BLD: 26.6 %
MCH RBC QN AUTO: 29.8 PG (ref 26–34)
MCHC RBC AUTO-ENTMCNC: 33.8 G/DL (ref 31–36)
MCV RBC AUTO: 88.1 FL (ref 80–100)
MONOCYTES # BLD: 0.8 K/UL (ref 0–1.3)
MONOCYTES NFR BLD: 7.5 %
NEUTROPHILS # BLD: 6.4 K/UL (ref 1.7–7.7)
NEUTROPHILS NFR BLD: 63 %
PLATELET # BLD AUTO: 172 K/UL (ref 135–450)
PMV BLD AUTO: 10.2 FL (ref 5–10.5)
POTASSIUM SERPL-SCNC: 4.4 MMOL/L (ref 3.5–5.1)
PROT SERPL-MCNC: 6.7 G/DL (ref 6.4–8.2)
RBC # BLD AUTO: 3.75 M/UL (ref 4–5.2)
SODIUM SERPL-SCNC: 141 MMOL/L (ref 136–145)
TROPONIN, HIGH SENSITIVITY: 9 NG/L (ref 0–14)
TROPONIN, HIGH SENSITIVITY: 9 NG/L (ref 0–14)
WBC # BLD AUTO: 10.1 K/UL (ref 4–11)

## 2024-04-20 PROCEDURE — 71260 CT THORAX DX C+: CPT

## 2024-04-20 PROCEDURE — 71045 X-RAY EXAM CHEST 1 VIEW: CPT

## 2024-04-20 PROCEDURE — 85025 COMPLETE CBC W/AUTO DIFF WBC: CPT

## 2024-04-20 PROCEDURE — 99285 EMERGENCY DEPT VISIT HI MDM: CPT

## 2024-04-20 PROCEDURE — 85379 FIBRIN DEGRADATION QUANT: CPT

## 2024-04-20 PROCEDURE — 80053 COMPREHEN METABOLIC PANEL: CPT

## 2024-04-20 PROCEDURE — 84484 ASSAY OF TROPONIN QUANT: CPT

## 2024-04-20 PROCEDURE — 93010 ELECTROCARDIOGRAM REPORT: CPT | Performed by: INTERNAL MEDICINE

## 2024-04-20 PROCEDURE — 93005 ELECTROCARDIOGRAM TRACING: CPT | Performed by: EMERGENCY MEDICINE

## 2024-04-20 PROCEDURE — 6360000004 HC RX CONTRAST MEDICATION: Performed by: EMERGENCY MEDICINE

## 2024-04-20 RX ORDER — ASPIRIN 81 MG/1
324 TABLET, CHEWABLE ORAL ONCE
Status: DISCONTINUED | OUTPATIENT
Start: 2024-04-20 | End: 2024-04-20 | Stop reason: HOSPADM

## 2024-04-20 RX ADMIN — IOPAMIDOL 75 ML: 755 INJECTION, SOLUTION INTRAVENOUS at 03:29

## 2024-04-20 ASSESSMENT — LIFESTYLE VARIABLES
HOW MANY STANDARD DRINKS CONTAINING ALCOHOL DO YOU HAVE ON A TYPICAL DAY: PATIENT DOES NOT DRINK
HOW OFTEN DO YOU HAVE A DRINK CONTAINING ALCOHOL: NEVER

## 2024-04-20 ASSESSMENT — PAIN SCALES - GENERAL: PAINLEVEL_OUTOF10: 2

## 2024-04-20 ASSESSMENT — PAIN - FUNCTIONAL ASSESSMENT: PAIN_FUNCTIONAL_ASSESSMENT: 0-10

## 2024-04-20 ASSESSMENT — HEART SCORE: ECG: NORMAL

## 2024-04-20 NOTE — ED PROVIDER NOTES
EMERGENCY MEDICINE PROVIDER NOTE    Patient Identification  Pt Name: Linda Cabral  MRN: [id number]  Birthdate 1950  Date of evaluation: 4/20/2024  Provider: Larry Stein MD  PCP: Alina Mosley MD    Chief Complaint  Chest Pain (PT states she has a dull chest pain that began around 7pm last night. PT states sharp pain to mid back.)      HPI  (History provided by patient)  This is a 73 y.o. female who was brought in by self for chest pain.  Chest pain is substernal without radiation to the arms, neck, jaw, or back.  It is described as aching and dull.  It occurs in the episodes lasting less than 1 minute with complete resolution in between episodes.  She has had associated shortness of breath, but denies diaphoresis.  She has never had this before.    I have reviewed the following nursing documentation:  Allergies: Cephalexin and Penicillins    Past medical history:   Past Medical History:   Diagnosis Date    Allergic rhinitis     Environmental allergies    Asthma     Colorectal polyps     Diabetes (HCC)     type 2    Hyperlipidemia     Menopause     Onset of menopause at age 35    Obesity     Thyroid nodule      Past surgical history:   Past Surgical History:   Procedure Laterality Date    CHOLECYSTECTOMY, LAPAROSCOPIC N/A 2/2/2023    LAPAROSCOPIC CHOLECYSTECTOMY WITH INTRAOPERATIVE CHOLANGIOGRAM performed by Vishal Christianson MD at [facility] OR    COLONOSCOPY      HYSTERECTOMY (CERVIX STATUS UNKNOWN)      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    ATORVASTATIN (LIPITOR) 40 MG TABLET    Take 80 mg by mouth at bedtime    HYDRALAZINE (APRESOLINE) 25 MG TABLET    TAKE 1 TABLET (25 MG TOTAL) BY MOUTH 2 TIMES A DAY. FOR HTN    HYDROCHLOROTHIAZIDE (HYDRODIURIL) 25 MG TABLET    TAKE 1 TABLET BY MOUTH EVERY DAY    JANUVIA 50 MG TABLET    TAKE 1 TABLET BY MOUTH EVERY DAY  performed during the hospital encounter of 04/20/24   CBC with Auto Differential   Result Value Ref Range    WBC 10.1 4.0 - 11.0 K/uL    RBC 3.75 (L) 4.00 - 5.20 M/uL    Hemoglobin 11.2 (L) 12.0 - 16.0 g/dL    Hematocrit 33.0 (L) 36.0 - 48.0 %    MCV 88.1 80.0 - 100.0 fL    MCH 29.8 26.0 - 34.0 pg    MCHC 33.8 31.0 - 36.0 g/dL    RDW 16.2 (H) 12.4 - 15.4 %    Platelets 172 135 - 450 K/uL    MPV 10.2 5.0 - 10.5 fL    Neutrophils % 63.0 %    Lymphocytes % 26.6 %    Monocytes % 7.5 %    Eosinophils % 2.0 %    Basophils % 0.9 %    Neutrophils Absolute 6.4 1.7 - 7.7 K/uL    Lymphocytes Absolute 2.7 1.0 - 5.1 K/uL    Monocytes Absolute 0.8 0.0 - 1.3 K/uL    Eosinophils Absolute 0.2 0.0 - 0.6 K/uL    Basophils Absolute 0.1 0.0 - 0.2 K/uL   Comprehensive Metabolic Panel w/ Reflex to MG   Result Value Ref Range    Sodium 141 136 - 145 mmol/L    Potassium reflex Magnesium 4.4 3.5 - 5.1 mmol/L    Chloride 107 99 - 110 mmol/L    CO2 24 21 - 32 mmol/L    Anion Gap 10 3 - 16    Glucose 153 (H) 70 - 99 mg/dL    BUN 22 (H) 7 - 20 mg/dL    Creatinine 1.2 0.6 - 1.2 mg/dL    Est, Glom Filt Rate 48 (A) >60    Calcium 8.8 8.3 - 10.6 mg/dL    Total Protein 6.7 6.4 - 8.2 g/dL    Albumin 3.6 3.4 - 5.0 g/dL    Albumin/Globulin Ratio 1.2 1.1 - 2.2    Total Bilirubin <0.2 0.0 - 1.0 mg/dL    Alkaline Phosphatase 114 40 - 129 U/L    ALT 13 10 - 40 U/L    AST 21 15 - 37 U/L   Troponin   Result Value Ref Range    Troponin, High Sensitivity 9 0 - 14 ng/L   Troponin   Result Value Ref Range    Troponin, High Sensitivity 9 0 - 14 ng/L   D-Dimer, Quantitative   Result Value Ref Range    D-Dimer, Quant 1.62 (H) 0.00 - 0.60 ug/mL FEU   EKG 12 Lead   Result Value Ref Range    Ventricular Rate 61 BPM    Atrial Rate 61 BPM    P-R Interval 192 ms    QRS Duration 84 ms    Q-T Interval 434 ms    QTc Calculation (Bazett) 436 ms    P Axis 71 degrees    R Axis 49 degrees    T Axis 52 degrees    Diagnosis Normal sinus rhythmNormal ECG        SEP-1  Is this

## (undated) DEVICE — LAPAROSCOPIC SCISSORS: Brand: EPIX LAPAROSCOPIC SCISSORS

## (undated) DEVICE — MERCY FAIRFIELD TURNOVER KIT: Brand: MEDLINE INDUSTRIES, INC.

## (undated) DEVICE — APPLICATOR MEDICATED 26 CC SOLUTION HI LT ORNG CHLORAPREP

## (undated) DEVICE — TROCAR: Brand: KII® SLEEVE

## (undated) DEVICE — DRAPE,LAP,CHOLE,W/TROUGHS,STERILE: Brand: MEDLINE

## (undated) DEVICE — DRAPE,REIN 53X77,STERILE: Brand: MEDLINE

## (undated) DEVICE — NEEDLE,22GX1.5",REG,BEVEL: Brand: MEDLINE

## (undated) DEVICE — C-ARM: Brand: UNBRANDED

## (undated) DEVICE — STERILE POLYISOPRENE POWDER-FREE SURGICAL GLOVES: Brand: PROTEXIS

## (undated) DEVICE — ELECTRODE PT RET AD L9FT HI MOIST COND ADH HYDRGEL CORDED

## (undated) DEVICE — NEEDLE INSUF L120MM DIA2MM DISP FOR PNEUMOPERI ENDOPATH

## (undated) DEVICE — SET EXTN PRIMING 4.9ML L30IN INCL SLDE CLMP SPIN M LUERLOCK

## (undated) DEVICE — SUTURE VCRL + SZ 4-0 L18IN ABSRB UD L19MM PS-2 3/8 CIR PRIM VCP496H

## (undated) DEVICE — COVER LT HNDL BLU PLAS

## (undated) DEVICE — CATHETER CHOLGM 4.5FR L18IN W/ MTL SUPP TB

## (undated) DEVICE — SYRINGE MED 30ML STD CLR PLAS LUERLOCK TIP N CTRL DISP

## (undated) DEVICE — ADHESIVE SKIN CLSR 0.7ML TOP DERMBND ADV

## (undated) DEVICE — Device

## (undated) DEVICE — SUTURE VCRL + SZ 0 L27IN CT 3 ABSRB VCP329H

## (undated) DEVICE — TISSUE RETRIEVAL SYSTEM: Brand: INZII RETRIEVAL SYSTEM

## (undated) DEVICE — SPONGE,LAP,4"X18",XR,ST,5/PK,40PK/CS: Brand: MEDLINE INDUSTRIES, INC.

## (undated) DEVICE — APPLIER CLP M/L SHFT DIA5MM 15 LIG LIGAMAX 5

## (undated) DEVICE — SYRINGE, LUER LOCK, 10ML: Brand: MEDLINE

## (undated) DEVICE — CATHETER CHOLANGIOGRAM 4.5 FRX3 IN W/ 20018M55 TAUT INTRO

## (undated) DEVICE — TROCAR: Brand: KII FIOS FIRST ENTRY

## (undated) DEVICE — LAPAROSCOPY PACK: Brand: MEDLINE INDUSTRIES, INC.

## (undated) DEVICE — PLUMEPORT ACTIV LAPAROSCOPIC SMOKE FILTRATION DEVICE: Brand: PLUMEPORT ACTIVE

## (undated) DEVICE — CORD ES L10FT MPLR LAP

## (undated) DEVICE — SOLUTION IRRIG 1000ML 0.9% SOD CHL USP POUR PLAS BTL